# Patient Record
Sex: MALE | Race: BLACK OR AFRICAN AMERICAN | Employment: FULL TIME | URBAN - METROPOLITAN AREA
[De-identification: names, ages, dates, MRNs, and addresses within clinical notes are randomized per-mention and may not be internally consistent; named-entity substitution may affect disease eponyms.]

---

## 2023-09-05 ENCOUNTER — APPOINTMENT (OUTPATIENT)
Dept: GENERAL RADIOLOGY | Age: 53
End: 2023-09-05
Attending: EMERGENCY MEDICINE
Payer: COMMERCIAL

## 2023-09-05 ENCOUNTER — APPOINTMENT (OUTPATIENT)
Dept: CT IMAGING | Age: 53
End: 2023-09-05
Payer: COMMERCIAL

## 2023-09-05 ENCOUNTER — HOSPITAL ENCOUNTER (EMERGENCY)
Age: 53
Discharge: HOME | End: 2023-09-06
Attending: EMERGENCY MEDICINE
Payer: COMMERCIAL

## 2023-09-05 DIAGNOSIS — R10.13 ABDOMINAL PAIN, EPIGASTRIC: Primary | ICD-10-CM

## 2023-09-05 LAB
ALBUMIN SERPL-MCNC: 4.1 GM/DL (ref 3.4–5)
ALP BLD-CCNC: 78 IU/L (ref 40–129)
ALT SERPL-CCNC: 56 U/L (ref 10–40)
ANION GAP SERPL CALCULATED.3IONS-SCNC: 9 MMOL/L (ref 4–16)
AST SERPL-CCNC: 73 IU/L (ref 15–37)
BASOPHILS ABSOLUTE: 0 K/CU MM
BASOPHILS RELATIVE PERCENT: 0.4 % (ref 0–1)
BILIRUB SERPL-MCNC: 0.4 MG/DL (ref 0–1)
BUN SERPL-MCNC: 20 MG/DL (ref 6–23)
CALCIUM SERPL-MCNC: 8.9 MG/DL (ref 8.3–10.6)
CHLORIDE BLD-SCNC: 104 MMOL/L (ref 99–110)
CO2: 25 MMOL/L (ref 21–32)
CREAT SERPL-MCNC: 1.4 MG/DL (ref 0.9–1.3)
DIFFERENTIAL TYPE: ABNORMAL
EOSINOPHILS ABSOLUTE: 0.2 K/CU MM
EOSINOPHILS RELATIVE PERCENT: 4 % (ref 0–3)
GFR SERPL CREATININE-BSD FRML MDRD: >60 ML/MIN/1.73M2
GLUCOSE SERPL-MCNC: 96 MG/DL (ref 70–99)
HCT VFR BLD CALC: 39 % (ref 42–52)
HEMOGLOBIN: 12.5 GM/DL (ref 13.5–18)
IMMATURE NEUTROPHIL %: 1.9 % (ref 0–0.43)
LIPASE: 55 IU/L (ref 13–60)
LYMPHOCYTES ABSOLUTE: 1 K/CU MM
LYMPHOCYTES RELATIVE PERCENT: 21.6 % (ref 24–44)
MCH RBC QN AUTO: 28.5 PG (ref 27–31)
MCHC RBC AUTO-ENTMCNC: 32.1 % (ref 32–36)
MCV RBC AUTO: 88.8 FL (ref 78–100)
MONOCYTES ABSOLUTE: 0.5 K/CU MM
MONOCYTES RELATIVE PERCENT: 9.6 % (ref 0–4)
NUCLEATED RBC %: 0 %
PDW BLD-RTO: 13.2 % (ref 11.7–14.9)
PLATELET # BLD: 194 K/CU MM (ref 140–440)
PMV BLD AUTO: 10.6 FL (ref 7.5–11.1)
POTASSIUM SERPL-SCNC: 4.6 MMOL/L (ref 3.5–5.1)
RBC # BLD: 4.39 M/CU MM (ref 4.6–6.2)
SEGMENTED NEUTROPHILS ABSOLUTE COUNT: 3 K/CU MM
SEGMENTED NEUTROPHILS RELATIVE PERCENT: 62.5 % (ref 36–66)
SODIUM BLD-SCNC: 138 MMOL/L (ref 135–145)
TOTAL IMMATURE NEUTOROPHIL: 0.09 K/CU MM
TOTAL NUCLEATED RBC: 0 K/CU MM
TOTAL PROTEIN: 8 GM/DL (ref 6.4–8.2)
TROPONIN T: <0.01 NG/ML
WBC # BLD: 4.8 K/CU MM (ref 4–10.5)

## 2023-09-05 PROCEDURE — 74177 CT ABD & PELVIS W/CONTRAST: CPT

## 2023-09-05 PROCEDURE — 82150 ASSAY OF AMYLASE: CPT

## 2023-09-05 PROCEDURE — 80053 COMPREHEN METABOLIC PANEL: CPT

## 2023-09-05 PROCEDURE — 6370000000 HC RX 637 (ALT 250 FOR IP): Performed by: EMERGENCY MEDICINE

## 2023-09-05 PROCEDURE — 85025 COMPLETE CBC W/AUTO DIFF WBC: CPT

## 2023-09-05 PROCEDURE — 71045 X-RAY EXAM CHEST 1 VIEW: CPT

## 2023-09-05 PROCEDURE — 83690 ASSAY OF LIPASE: CPT

## 2023-09-05 PROCEDURE — 84484 ASSAY OF TROPONIN QUANT: CPT

## 2023-09-05 PROCEDURE — 96374 THER/PROPH/DIAG INJ IV PUSH: CPT

## 2023-09-05 PROCEDURE — 93005 ELECTROCARDIOGRAM TRACING: CPT | Performed by: EMERGENCY MEDICINE

## 2023-09-05 PROCEDURE — 99285 EMERGENCY DEPT VISIT HI MDM: CPT

## 2023-09-05 PROCEDURE — 6360000002 HC RX W HCPCS: Performed by: EMERGENCY MEDICINE

## 2023-09-05 RX ORDER — ONDANSETRON 2 MG/ML
8 INJECTION INTRAMUSCULAR; INTRAVENOUS ONCE
Status: COMPLETED | OUTPATIENT
Start: 2023-09-05 | End: 2023-09-05

## 2023-09-05 RX ORDER — ACETAMINOPHEN 325 MG/1
650 TABLET ORAL ONCE
Status: COMPLETED | OUTPATIENT
Start: 2023-09-05 | End: 2023-09-05

## 2023-09-05 RX ADMIN — ACETAMINOPHEN 650 MG: 325 TABLET ORAL at 23:39

## 2023-09-05 RX ADMIN — ONDANSETRON 8 MG: 2 INJECTION INTRAMUSCULAR; INTRAVENOUS at 23:39

## 2023-09-05 ASSESSMENT — PAIN DESCRIPTION - DESCRIPTORS: DESCRIPTORS: BURNING;PRESSURE

## 2023-09-05 ASSESSMENT — PAIN DESCRIPTION - ORIENTATION: ORIENTATION: MID;UPPER

## 2023-09-05 ASSESSMENT — PAIN SCALES - GENERAL: PAINLEVEL_OUTOF10: 7

## 2023-09-05 ASSESSMENT — PAIN DESCRIPTION - LOCATION: LOCATION: ABDOMEN

## 2023-09-06 VITALS
HEART RATE: 80 BPM | OXYGEN SATURATION: 98 % | TEMPERATURE: 98.6 F | SYSTOLIC BLOOD PRESSURE: 104 MMHG | DIASTOLIC BLOOD PRESSURE: 74 MMHG | RESPIRATION RATE: 20 BRPM

## 2023-09-06 PROCEDURE — 6360000004 HC RX CONTRAST MEDICATION: Performed by: EMERGENCY MEDICINE

## 2023-09-06 RX ORDER — OMEPRAZOLE 20 MG/1
20 CAPSULE, DELAYED RELEASE ORAL
Qty: 30 CAPSULE | Refills: 0 | Status: SHIPPED | OUTPATIENT
Start: 2023-09-06

## 2023-09-06 RX ADMIN — IOPAMIDOL 80 ML: 755 INJECTION, SOLUTION INTRAVENOUS at 00:00

## 2023-09-06 NOTE — ED TRIAGE NOTES
Patient reports to thew ER with complaints of fevers, abdominal pain. Dizziness when standing and lack of appetite.

## 2023-09-06 NOTE — ED PROVIDER NOTES
Triage Chief Complaint:   Abdominal Pain    Chitina:  Salina Bhakta is a 46 y.o. male that presents with abdominal pain. The patient states that for the past 3 to 4 days he has been having fairly persistent but occasionally worse at times epigastric pain that is pressure-like in nature associate with nausea. Denies any chest pain, shortness of breath, vomiting, diarrhea. Believes that he may have had some fevers. No cough. No other acute complaints. No prior surgical history. Does not follow with a PCP. Does not take any prescription medications at home. ROS:  At least 10 systems reviewed and otherwise acutely negative except as in the [address] No past medical history on file. No past surgical history on file. No family history on file. Social History     Socioeconomic History    Marital status: Single     Spouse name: Not on file    Number of children: Not on file    Years of education: Not on file    Highest education level: Not on file   Occupational History    Not on file   Tobacco Use    Smoking status: Not on file    Smokeless tobacco: Not on file   Substance and Sexual Activity    Alcohol use: Not on file    Drug use: Not on file    Sexual activity: Not on file   Other Topics Concern    Not on file   Social History Narrative    Not on file     Social Determinants of Health     Financial Resource Strain: Not on file   Food Insecurity: Not on file   Transportation Needs: Not on file   Physical Activity: Not on file   Stress: Not on file   Social Connections: Not on file   Intimate Partner Violence: Not on file   Housing Stability: Not on file     No current facility-administered medications for this encounter.      Current Outpatient Medications   Medication Sig Dispense Refill    omeprazole (PRILOSEC) 20 MG delayed release capsule Take 1 capsule by mouth every morning (before breakfast) 30 capsule 0     No Known Allergies    Nursing Notes Reviewed    Physical Exam:  ED Triage Vitals [09/05/23 2108]

## 2023-09-07 LAB
EKG ATRIAL RATE: 89 BPM
EKG DIAGNOSIS: NORMAL
EKG P AXIS: 63 DEGREES
EKG P-R INTERVAL: 154 MS
EKG Q-T INTERVAL: 362 MS
EKG QRS DURATION: 68 MS
EKG QTC CALCULATION (BAZETT): 440 MS
EKG R AXIS: 47 DEGREES
EKG T AXIS: 74 DEGREES
EKG VENTRICULAR RATE: 89 BPM

## 2023-09-07 PROCEDURE — 93010 ELECTROCARDIOGRAM REPORT: CPT | Performed by: INTERNAL MEDICINE

## 2023-09-29 ENCOUNTER — APPOINTMENT (OUTPATIENT)
Dept: GENERAL RADIOLOGY | Age: 53
End: 2023-09-29
Payer: COMMERCIAL

## 2023-09-29 ENCOUNTER — HOSPITAL ENCOUNTER (EMERGENCY)
Age: 53
Discharge: HOME | End: 2023-09-29
Attending: EMERGENCY MEDICINE
Payer: COMMERCIAL

## 2023-09-29 VITALS
SYSTOLIC BLOOD PRESSURE: 145 MMHG | WEIGHT: 120 LBS | RESPIRATION RATE: 16 BRPM | BODY MASS INDEX: 14.92 KG/M2 | TEMPERATURE: 98.4 F | HEIGHT: 75 IN | HEART RATE: 92 BPM | OXYGEN SATURATION: 100 % | DIASTOLIC BLOOD PRESSURE: 101 MMHG

## 2023-09-29 DIAGNOSIS — M25.562 PAIN IN BOTH KNEES, UNSPECIFIED CHRONICITY: ICD-10-CM

## 2023-09-29 DIAGNOSIS — M25.561 PAIN IN BOTH KNEES, UNSPECIFIED CHRONICITY: ICD-10-CM

## 2023-09-29 DIAGNOSIS — R05.9 COUGH, UNSPECIFIED TYPE: Primary | ICD-10-CM

## 2023-09-29 LAB
ALBUMIN SERPL-MCNC: 3.7 GM/DL (ref 3.4–5)
ALP BLD-CCNC: 95 IU/L (ref 40–129)
ALT SERPL-CCNC: 67 U/L (ref 10–40)
ANION GAP SERPL CALCULATED.3IONS-SCNC: 8 MMOL/L (ref 4–16)
AST SERPL-CCNC: 102 IU/L (ref 15–37)
BASOPHILS ABSOLUTE: 0 K/CU MM
BASOPHILS RELATIVE PERCENT: 1 % (ref 0–1)
BILIRUB SERPL-MCNC: 0.3 MG/DL (ref 0–1)
BILIRUBIN URINE: NEGATIVE MG/DL
BLOOD, URINE: NEGATIVE
BUN SERPL-MCNC: 19 MG/DL (ref 6–23)
CALCIUM SERPL-MCNC: 8.7 MG/DL (ref 8.3–10.6)
CHLORIDE BLD-SCNC: 106 MMOL/L (ref 99–110)
CLARITY: CLEAR
CO2: 28 MMOL/L (ref 21–32)
COLOR: YELLOW
COMMENT UA: NORMAL
CREAT SERPL-MCNC: 1.4 MG/DL (ref 0.9–1.3)
DIFFERENTIAL TYPE: ABNORMAL
EKG ATRIAL RATE: 88 BPM
EKG DIAGNOSIS: NORMAL
EKG P AXIS: 45 DEGREES
EKG P-R INTERVAL: 134 MS
EKG Q-T INTERVAL: 364 MS
EKG QRS DURATION: 76 MS
EKG QTC CALCULATION (BAZETT): 440 MS
EKG R AXIS: 64 DEGREES
EKG T AXIS: 67 DEGREES
EKG VENTRICULAR RATE: 88 BPM
EOSINOPHILS ABSOLUTE: 0.3 K/CU MM
EOSINOPHILS RELATIVE PERCENT: 8.9 % (ref 0–3)
GFR SERPL CREATININE-BSD FRML MDRD: >60 ML/MIN/1.73M2
GLUCOSE SERPL-MCNC: 105 MG/DL (ref 70–99)
GLUCOSE, URINE: NEGATIVE MG/DL
HCT VFR BLD CALC: 30.3 % (ref 42–52)
HEMOGLOBIN: 9.7 GM/DL (ref 13.5–18)
IMMATURE NEUTROPHIL %: 3.4 % (ref 0–0.43)
KETONES, URINE: NEGATIVE MG/DL
LACTATE: 1.3 MMOL/L (ref 0.5–1.9)
LEUKOCYTE ESTERASE, URINE: NEGATIVE
LYMPHOCYTES ABSOLUTE: 1.5 K/CU MM
LYMPHOCYTES RELATIVE PERCENT: 39.5 % (ref 24–44)
MCH RBC QN AUTO: 29.4 PG (ref 27–31)
MCHC RBC AUTO-ENTMCNC: 32 % (ref 32–36)
MCV RBC AUTO: 91.8 FL (ref 78–100)
MONOCYTES ABSOLUTE: 0.5 K/CU MM
MONOCYTES RELATIVE PERCENT: 13.6 % (ref 0–4)
NITRITE URINE, QUANTITATIVE: NEGATIVE
NUCLEATED RBC %: 0 %
PDW BLD-RTO: 13.8 % (ref 11.7–14.9)
PH, URINE: 6.5 (ref 5–8)
PLATELET # BLD: 191 K/CU MM (ref 140–440)
PMV BLD AUTO: 10.8 FL (ref 7.5–11.1)
POTASSIUM SERPL-SCNC: 4.2 MMOL/L (ref 3.5–5.1)
PRO-BNP: 119.9 PG/ML
PROTEIN UA: NEGATIVE MG/DL
RBC # BLD: 3.3 M/CU MM (ref 4.6–6.2)
SARS-COV-2 RDRP RESP QL NAA+PROBE: NOT DETECTED
SEGMENTED NEUTROPHILS ABSOLUTE COUNT: 1.3 K/CU MM
SEGMENTED NEUTROPHILS RELATIVE PERCENT: 33.6 % (ref 36–66)
SODIUM BLD-SCNC: 142 MMOL/L (ref 135–145)
SOURCE: NORMAL
SPECIFIC GRAVITY UA: <1.005 (ref 1–1.03)
TOTAL CK: 43 IU/L (ref 38–174)
TOTAL IMMATURE NEUTOROPHIL: 0.13 K/CU MM
TOTAL NUCLEATED RBC: 0 K/CU MM
TOTAL PROTEIN: 7.6 GM/DL (ref 6.4–8.2)
TROPONIN T: <0.01 NG/ML
UROBILINOGEN, URINE: 0.2 MG/DL (ref 0.2–1)
WBC # BLD: 3.8 K/CU MM (ref 4–10.5)

## 2023-09-29 PROCEDURE — 82550 ASSAY OF CK (CPK): CPT

## 2023-09-29 PROCEDURE — 6370000000 HC RX 637 (ALT 250 FOR IP): Performed by: EMERGENCY MEDICINE

## 2023-09-29 PROCEDURE — 2500000003 HC RX 250 WO HCPCS: Performed by: EMERGENCY MEDICINE

## 2023-09-29 PROCEDURE — 93010 ELECTROCARDIOGRAM REPORT: CPT | Performed by: INTERNAL MEDICINE

## 2023-09-29 PROCEDURE — 80053 COMPREHEN METABOLIC PANEL: CPT

## 2023-09-29 PROCEDURE — 83605 ASSAY OF LACTIC ACID: CPT

## 2023-09-29 PROCEDURE — 83880 ASSAY OF NATRIURETIC PEPTIDE: CPT

## 2023-09-29 PROCEDURE — 73562 X-RAY EXAM OF KNEE 3: CPT

## 2023-09-29 PROCEDURE — 99285 EMERGENCY DEPT VISIT HI MDM: CPT

## 2023-09-29 PROCEDURE — 96374 THER/PROPH/DIAG INJ IV PUSH: CPT

## 2023-09-29 PROCEDURE — 85025 COMPLETE CBC W/AUTO DIFF WBC: CPT

## 2023-09-29 PROCEDURE — 6360000002 HC RX W HCPCS: Performed by: EMERGENCY MEDICINE

## 2023-09-29 PROCEDURE — 71045 X-RAY EXAM CHEST 1 VIEW: CPT

## 2023-09-29 PROCEDURE — 87635 SARS-COV-2 COVID-19 AMP PRB: CPT

## 2023-09-29 PROCEDURE — 81003 URINALYSIS AUTO W/O SCOPE: CPT

## 2023-09-29 PROCEDURE — 93005 ELECTROCARDIOGRAM TRACING: CPT | Performed by: EMERGENCY MEDICINE

## 2023-09-29 PROCEDURE — 84484 ASSAY OF TROPONIN QUANT: CPT

## 2023-09-29 RX ORDER — ACETAMINOPHEN 500 MG
1000 TABLET ORAL ONCE
Status: COMPLETED | OUTPATIENT
Start: 2023-09-29 | End: 2023-09-29

## 2023-09-29 RX ORDER — ACETAMINOPHEN 325 MG/1
650 TABLET ORAL EVERY 6 HOURS PRN
Qty: 120 TABLET | Refills: 3 | Status: SHIPPED | OUTPATIENT
Start: 2023-09-29 | End: 2023-12-18

## 2023-09-29 RX ORDER — CYCLOBENZAPRINE HCL 10 MG
10 TABLET ORAL 3 TIMES DAILY PRN
Qty: 30 TABLET | Refills: 0 | Status: SHIPPED | OUTPATIENT
Start: 2023-09-29 | End: 2023-10-09

## 2023-09-29 RX ORDER — GUAIFENESIN/DEXTROMETHORPHAN 100-10MG/5
5 SYRUP ORAL 4 TIMES DAILY PRN
Qty: 120 ML | Refills: 0 | Status: SHIPPED | OUTPATIENT
Start: 2023-09-29 | End: 2023-10-09

## 2023-09-29 RX ORDER — BENZONATATE 100 MG/1
100 CAPSULE ORAL ONCE
Status: COMPLETED | OUTPATIENT
Start: 2023-09-29 | End: 2023-09-29

## 2023-09-29 RX ORDER — NAPROXEN 500 MG/1
500 TABLET ORAL 2 TIMES DAILY
Qty: 60 TABLET | Refills: 0 | Status: SHIPPED | OUTPATIENT
Start: 2023-09-29 | End: 2023-11-20 | Stop reason: ALTCHOICE

## 2023-09-29 RX ORDER — KETOROLAC TROMETHAMINE 30 MG/ML
30 INJECTION, SOLUTION INTRAMUSCULAR; INTRAVENOUS ONCE
Status: COMPLETED | OUTPATIENT
Start: 2023-09-29 | End: 2023-09-29

## 2023-09-29 RX ORDER — GUAIFENESIN 200 MG/10ML
200 LIQUID ORAL ONCE
Status: COMPLETED | OUTPATIENT
Start: 2023-09-29 | End: 2023-09-29

## 2023-09-29 RX ORDER — BENZONATATE 100 MG/1
100 CAPSULE ORAL 3 TIMES DAILY PRN
Qty: 10 CAPSULE | Refills: 0 | Status: SHIPPED | OUTPATIENT
Start: 2023-09-29 | End: 2023-10-06

## 2023-09-29 RX ADMIN — ACETAMINOPHEN 1000 MG: 500 TABLET ORAL at 07:08

## 2023-09-29 RX ADMIN — BENZONATATE 100 MG: 100 CAPSULE ORAL at 07:08

## 2023-09-29 RX ADMIN — KETOROLAC TROMETHAMINE 30 MG: 30 INJECTION, SOLUTION INTRAMUSCULAR; INTRAVENOUS at 07:07

## 2023-09-29 RX ADMIN — GUAIFENESIN 200 MG: 100 SOLUTION ORAL at 07:20

## 2023-09-29 ASSESSMENT — ENCOUNTER SYMPTOMS
ALLERGIC/IMMUNOLOGIC NEGATIVE: 1
GASTROINTESTINAL NEGATIVE: 1
EYES NEGATIVE: 1
COUGH: 1

## 2023-09-29 NOTE — ED PROVIDER NOTES
thickness of the distal gastric body on and around axial image 73. The remainder of the visualized bowel including small large bowel shows no acute abnormality. Pelvis: No pelvic mass. The bladder is unremarkable. No free fluid in the pelvis. No pelvic hemorrhage. Trace free fluid in the pelvis without hemorrhage Peritoneum/Retroperitoneum: No aortic aneurysm. No dissection. No retroperitoneal or mesenteric lymphadenopathy. Bones/Soft Tissues: No acute osseous abnormality is seen. 1. Gastric wall thickening involving the distal gastric body which may be related to acute gastritis or possible underlying peptic ulcer disease, though no discrete ulceration is seen. No adjacent inflammatory change or pneumoperitoneum to suggest perforation. 2. Otherwise no acute process seen in the abdomen or pelvis. XR CHEST PORTABLE    Result Date: 9/5/2023  EXAMINATION: ONE XRAY VIEW OF THE CHEST 9/5/2023 11:25 pm COMPARISON: None. HISTORY: ORDERING SYSTEM PROVIDED HISTORY: chest pain TECHNOLOGIST PROVIDED HISTORY: Reason for exam:->chest pain Reason for Exam: Patient reports to thew ER with complaints of fevers, abdominal pain. Dizziness when standing and lack of appetite FINDINGS: No pneumothorax. Cardiac and mediastinal silhouettes appear within normal limits for size. Pulmonary vascularity is normal.  No focal infiltrate or pleural effusion. No pneumothorax. No acute osseous abnormality is seen. Clear chest without acute cardiopulmonary process. EKG (if obtained): (All EKG's are interpreted by myself in the absence of a cardiologist)    12 lead EKG per my interpretation:  Normal Sinus Rhythm 88  Axis is   Normal  QTc is   440  There is no specific T wave changes appreciated. There  is no  specific ST wave changes appreciated. Prior EKG to compare with was not available       MDM:    Patient here with complaint of bilateral knee pain and cough shortness of breath.   Again patient states that he was

## 2023-09-29 NOTE — ED TRIAGE NOTES
Pt to ED with c/o bilateral leg pain and a cough while laying down. Pt states the pain has been in both legs for 2-3 weeks, but is much worse when sitting or laying. He also says when he lays down he feels as if he can't get a full breath and starts coughing.

## 2023-11-20 ENCOUNTER — OFFICE VISIT (OUTPATIENT)
Dept: INTERNAL MEDICINE CLINIC | Age: 53
End: 2023-11-20
Payer: COMMERCIAL

## 2023-11-20 VITALS
DIASTOLIC BLOOD PRESSURE: 64 MMHG | HEART RATE: 92 BPM | TEMPERATURE: 97.2 F | RESPIRATION RATE: 16 BRPM | OXYGEN SATURATION: 98 % | SYSTOLIC BLOOD PRESSURE: 122 MMHG | BODY MASS INDEX: 17.38 KG/M2 | WEIGHT: 121.4 LBS | HEIGHT: 70 IN

## 2023-11-20 DIAGNOSIS — R79.89 ABNORMAL LFTS: ICD-10-CM

## 2023-11-20 DIAGNOSIS — R74.01 TRANSAMINITIS: ICD-10-CM

## 2023-11-20 DIAGNOSIS — R93.5 ABNORMAL CT OF THE ABDOMEN: ICD-10-CM

## 2023-11-20 DIAGNOSIS — N17.9 AKI (ACUTE KIDNEY INJURY) (HCC): ICD-10-CM

## 2023-11-20 DIAGNOSIS — D64.9 ANEMIA, UNSPECIFIED TYPE: ICD-10-CM

## 2023-11-20 PROCEDURE — 99203 OFFICE O/P NEW LOW 30 MIN: CPT | Performed by: INTERNAL MEDICINE

## 2023-11-20 RX ORDER — OMEPRAZOLE 20 MG/1
20 CAPSULE, DELAYED RELEASE ORAL
Qty: 30 CAPSULE | Refills: 2 | Status: SHIPPED | OUTPATIENT
Start: 2023-11-20

## 2023-11-20 RX ORDER — NAPROXEN 500 MG/1
500 TABLET ORAL 2 TIMES DAILY
Qty: 60 TABLET | Refills: 2 | Status: CANCELLED | OUTPATIENT
Start: 2023-11-20

## 2023-11-20 ASSESSMENT — ENCOUNTER SYMPTOMS
GASTROINTESTINAL NEGATIVE: 1
RESPIRATORY NEGATIVE: 1
ALLERGIC/IMMUNOLOGIC NEGATIVE: 1
EYES NEGATIVE: 1

## 2023-11-20 ASSESSMENT — PATIENT HEALTH QUESTIONNAIRE - PHQ9
SUM OF ALL RESPONSES TO PHQ QUESTIONS 1-9: 1
SUM OF ALL RESPONSES TO PHQ QUESTIONS 1-9: 1
SUM OF ALL RESPONSES TO PHQ9 QUESTIONS 1 & 2: 1
SUM OF ALL RESPONSES TO PHQ QUESTIONS 1-9: 1
SUM OF ALL RESPONSES TO PHQ QUESTIONS 1-9: 1
1. LITTLE INTEREST OR PLEASURE IN DOING THINGS: 0
2. FEELING DOWN, DEPRESSED OR HOPELESS: 1

## 2023-11-20 NOTE — PROGRESS NOTES
Trish Barrientos  Patient's  is 1970  Seen in office on 2023      SUBJECTIVE:  Harrison rosado 48 y. o.year old male presents today   Chief Complaint   Patient presents with    New Patient    Leg Pain     Bilateral leg pain, tingling sensation when lying down, since October    Medication Refill     The patient is here with his girlfriend. Lives in the [place]  Patient states he moved from [facility] about 2 years ago and works in the [employer]. Patient states he did not see doctors in ED and had no labs previously until 2023. Patient states on 9/3/2023 she had upper abdominal pain and headache near syncope  Patient went to the emergency room and had blood test done which were abnormal for AST and ALT elevation. CT scan of the abdomen also showed some gastritis or peptic ulcer disease. Patient was given omeprazole and patient was discharged to follow-up with PCP. Patient went to the emergency room on 2023 with pain in the legs. Patient stated he had pain in both legs was having difficulty getting up once he got up he was able to ambulate. UA was normal, COVID test was negative, lactic acid normal, CK was 43 that was normal, BNP was normal  Troponin was also normal, creatinine was 1.4, glucose 105, ALT was 67 and AST was 102, CBC showed white count of 3800, hemoglobin dropped to 9.7 hematocrit 30.3, platelet count was 1 91,000. Segs were 33%, lymphocytes 39%      Taking medications regularly. No side effects noted. Review of Systems   Constitutional: Negative. HENT: Negative. Eyes: Negative. Respiratory: Negative. Cardiovascular: Negative. Gastrointestinal: Negative. Endocrine: Negative. Genitourinary: Negative. Musculoskeletal: Negative. Patient had leg pain that is better. Skin: Negative. Allergic/Immunologic: Negative. Neurological: Negative. Hematological: Negative. Psychiatric/Behavioral: Negative.          OBJECTIVE: BP

## 2023-11-21 ENCOUNTER — TELEPHONE (OUTPATIENT)
Dept: GASTROENTEROLOGY | Age: 53
End: 2023-11-21

## 2023-11-21 NOTE — TELEPHONE ENCOUNTER
Called pt. In regards to a referral for anemia and abd CT scan. Lm for pt to call back to make an appt.

## 2023-11-27 ENCOUNTER — HOSPITAL ENCOUNTER (OUTPATIENT)
Age: 53
Discharge: HOME | End: 2023-11-27
Payer: COMMERCIAL

## 2023-11-27 DIAGNOSIS — R79.89 ABNORMAL LFTS: ICD-10-CM

## 2023-11-27 DIAGNOSIS — R74.01 TRANSAMINITIS: ICD-10-CM

## 2023-11-27 DIAGNOSIS — D64.9 ANEMIA, UNSPECIFIED TYPE: ICD-10-CM

## 2023-11-27 LAB
ALBUMIN SERPL-MCNC: 4.1 GM/DL (ref 3.4–5)
ALP BLD-CCNC: 78 IU/L (ref 40–129)
ALT SERPL-CCNC: 23 U/L (ref 10–40)
ANION GAP SERPL CALCULATED.3IONS-SCNC: 10 MMOL/L (ref 4–16)
AST SERPL-CCNC: 40 IU/L (ref 15–37)
BASOPHILS ABSOLUTE: 0 K/CU MM
BASOPHILS RELATIVE PERCENT: 0.6 % (ref 0–1)
BILIRUB SERPL-MCNC: 0.2 MG/DL (ref 0–1)
BUN SERPL-MCNC: 21 MG/DL (ref 6–23)
CALCIUM SERPL-MCNC: 8.9 MG/DL (ref 8.3–10.6)
CHLORIDE BLD-SCNC: 108 MMOL/L (ref 99–110)
CO2: 24 MMOL/L (ref 21–32)
CREAT SERPL-MCNC: 1.1 MG/DL (ref 0.9–1.3)
DIFFERENTIAL TYPE: ABNORMAL
EOSINOPHILS ABSOLUTE: 0.3 K/CU MM
EOSINOPHILS RELATIVE PERCENT: 5.6 % (ref 0–3)
FERRITIN: 1434 NG/ML (ref 30–400)
FOLATE SERPL-MCNC: >20 NG/ML (ref 3.1–17.5)
GFR SERPL CREATININE-BSD FRML MDRD: >60 ML/MIN/1.73M2
GLUCOSE SERPL-MCNC: 85 MG/DL (ref 70–99)
HAV IGM SERPL QL IA: NON REACTIVE
HBV CORE IGM SERPL QL IA: NON REACTIVE
HBV SURFACE AG SERPL QL IA: NON REACTIVE
HCT VFR BLD CALC: 41.5 % (ref 42–52)
HCV AB SERPL QL IA: NON REACTIVE
HEMOGLOBIN: 13 GM/DL (ref 13.5–18)
IMMATURE NEUTROPHIL %: 0.6 % (ref 0–0.43)
IRON: 82 UG/DL (ref 59–158)
LYMPHOCYTES ABSOLUTE: 1.4 K/CU MM
LYMPHOCYTES RELATIVE PERCENT: 29.4 % (ref 24–44)
MCH RBC QN AUTO: 29.3 PG (ref 27–31)
MCHC RBC AUTO-ENTMCNC: 31.3 % (ref 32–36)
MCV RBC AUTO: 93.5 FL (ref 78–100)
MONOCYTES ABSOLUTE: 0.6 K/CU MM
MONOCYTES RELATIVE PERCENT: 13.1 % (ref 0–4)
NUCLEATED RBC %: 0 %
PCT TRANSFERRIN: 29 % (ref 10–44)
PDW BLD-RTO: 13.2 % (ref 11.7–14.9)
PLATELET # BLD: 262 K/CU MM (ref 140–440)
PMV BLD AUTO: 10.6 FL (ref 7.5–11.1)
POTASSIUM SERPL-SCNC: 4.5 MMOL/L (ref 3.5–5.1)
RBC # BLD: 4.44 M/CU MM (ref 4.6–6.2)
SEGMENTED NEUTROPHILS ABSOLUTE COUNT: 2.4 K/CU MM
SEGMENTED NEUTROPHILS RELATIVE PERCENT: 50.7 % (ref 36–66)
SODIUM BLD-SCNC: 142 MMOL/L (ref 135–145)
TOTAL IMMATURE NEUTOROPHIL: 0.03 K/CU MM
TOTAL IRON BINDING CAPACITY: 281 UG/DL (ref 250–450)
TOTAL NUCLEATED RBC: 0 K/CU MM
TOTAL PROTEIN: 7.8 GM/DL (ref 6.4–8.2)
UNSATURATED IRON BINDING CAPACITY: 199 UG/DL (ref 110–370)
VITAMIN B-12: 1006 PG/ML (ref 211–911)
WBC # BLD: 4.7 K/CU MM (ref 4–10.5)

## 2023-11-27 PROCEDURE — 86038 ANTINUCLEAR ANTIBODIES: CPT

## 2023-11-27 PROCEDURE — 80053 COMPREHEN METABOLIC PANEL: CPT

## 2023-11-27 PROCEDURE — 82728 ASSAY OF FERRITIN: CPT

## 2023-11-27 PROCEDURE — 82607 VITAMIN B-12: CPT

## 2023-11-27 PROCEDURE — 86256 FLUORESCENT ANTIBODY TITER: CPT

## 2023-11-27 PROCEDURE — 83540 ASSAY OF IRON: CPT

## 2023-11-27 PROCEDURE — 80074 ACUTE HEPATITIS PANEL: CPT

## 2023-11-27 PROCEDURE — 85025 COMPLETE CBC W/AUTO DIFF WBC: CPT

## 2023-11-27 PROCEDURE — 36415 COLL VENOUS BLD VENIPUNCTURE: CPT

## 2023-11-27 PROCEDURE — 82746 ASSAY OF FOLIC ACID SERUM: CPT

## 2023-11-27 PROCEDURE — 83516 IMMUNOASSAY NONANTIBODY: CPT

## 2023-11-27 PROCEDURE — 83550 IRON BINDING TEST: CPT

## 2023-11-29 ENCOUNTER — TELEPHONE (OUTPATIENT)
Dept: GASTROENTEROLOGY | Age: 53
End: 2023-11-29

## 2023-11-30 LAB
MITOCHONDRIA M2 IGG SER-ACNC: 7.7 UNITS (ref 0–24.9)
NUCLEAR IGG SER QL IA: NORMAL
SMA IGG SER-ACNC: 9 UNITS (ref 0–19)

## 2023-12-05 ENCOUNTER — OFFICE VISIT (OUTPATIENT)
Dept: GASTROENTEROLOGY | Age: 53
End: 2023-12-05
Payer: COMMERCIAL

## 2023-12-05 VITALS
BODY MASS INDEX: 17.47 KG/M2 | TEMPERATURE: 98.4 F | WEIGHT: 122 LBS | DIASTOLIC BLOOD PRESSURE: 66 MMHG | SYSTOLIC BLOOD PRESSURE: 124 MMHG | HEIGHT: 70 IN | HEART RATE: 98 BPM | OXYGEN SATURATION: 99 %

## 2023-12-05 DIAGNOSIS — R93.5 ABNORMAL CT OF THE ABDOMEN: Primary | ICD-10-CM

## 2023-12-05 DIAGNOSIS — D64.9 NORMOCYTIC ANEMIA: ICD-10-CM

## 2023-12-05 PROCEDURE — 99204 OFFICE O/P NEW MOD 45 MIN: CPT | Performed by: INTERNAL MEDICINE

## 2023-12-05 RX ORDER — OMEPRAZOLE 40 MG/1
40 CAPSULE, DELAYED RELEASE ORAL
Qty: 30 CAPSULE | Refills: 1 | Status: SHIPPED | OUTPATIENT
Start: 2023-12-05

## 2023-12-05 NOTE — PROGRESS NOTES
[facility] Gastroenterology and Hepatology             MD Sriram Loredo MD             Miranda Abarca, APRN-CNP       Estrada Roach, APRN-CNP             [address] [facility] [address] Critical access hospital, [address] [facility]             [phone] fax [phone]        Gastroenterology Clinic Consultation    Sriram Michaud MD  Encounter Date: 12/05/23     CC: Anemia and Other (Abn CT scan)       Patient was referred by Dr. Roxann Garcia for abnormal CT scan and anemia. History obtained from: patient, family, medical records. Subjective:      Vic Null is an 48 y. o.  male who presents for Anemia and Other (Abn CT scan)   Patient is here for anemia. Hemoglobin is noted to be low at 12.5 on 9/5/2023 and it dropped on 9/29/23 to 9.7. His most recent hemoglobin is 13.0 on 11/27/2023. AST slightly elevated at 40. he has been followed Patient reports that his stools are a bristol 3. His iron studies done on iron supplementation on 11/27/2023 noted to be with serum iron of 82, TIBC 281, percent saturation 29 and ferritin 1434. LFTs were noted to be normal except for mild elevation in AST. BIANCA, AMA, ASMA, acute hepatitis panel all negative. Patient currently denies any significant GI issues. He does endorse and using NSAID use. No family history of colon cancer. He denies any nausea, vomiting, dysphagia, melena or hematochezia. Patient Active Problem List   Diagnosis    Anemia    Abnormal LFTs    Transaminitis    FRANCISCO (acute kidney injury) ([address])    Abnormal CT of the abdomen         No past medical history on file. No past surgical history on file. No family history on file.      Social History     Socioeconomic History    Marital status: Single     Spouse name: Not on file    Number of children: Not on file    Years of education: Not on file    Highest education level: Not on file   Occupational History    Not on file   Tobacco Use    Smoking status: Never

## 2023-12-18 PROBLEM — D72.821 MONOCYTOSIS: Status: ACTIVE | Noted: 2023-12-18

## 2023-12-18 PROBLEM — R79.89 ELEVATED FERRITIN LEVEL: Status: ACTIVE | Noted: 2023-12-18

## 2024-01-18 ENCOUNTER — HOSPITAL ENCOUNTER (OUTPATIENT)
Age: 54
Discharge: HOME | End: 2024-01-18
Payer: COMMERCIAL

## 2024-01-18 DIAGNOSIS — R79.89 ELEVATED FERRITIN LEVEL: ICD-10-CM

## 2024-01-18 DIAGNOSIS — D72.821 MONOCYTOSIS: ICD-10-CM

## 2024-01-18 LAB
BASOPHILS ABSOLUTE: 0 K/CU MM
BASOPHILS RELATIVE PERCENT: 0.7 % (ref 0–1)
DIFFERENTIAL TYPE: ABNORMAL
EOSINOPHILS ABSOLUTE: 0.5 K/CU MM
EOSINOPHILS RELATIVE PERCENT: 8.1 % (ref 0–3)
FERRITIN: 1079 NG/ML (ref 30–400)
HCT VFR BLD CALC: 38.6 % (ref 42–52)
HEMOGLOBIN: 12.4 GM/DL (ref 13.5–18)
IMMATURE NEUTROPHIL %: 1.1 % (ref 0–0.43)
LYMPHOCYTES ABSOLUTE: 1.8 K/CU MM
LYMPHOCYTES RELATIVE PERCENT: 31.8 % (ref 24–44)
MCH RBC QN AUTO: 29.2 PG (ref 27–31)
MCHC RBC AUTO-ENTMCNC: 32.1 % (ref 32–36)
MCV RBC AUTO: 90.8 FL (ref 78–100)
MONOCYTES ABSOLUTE: 0.8 K/CU MM
MONOCYTES RELATIVE PERCENT: 14 % (ref 0–4)
NUCLEATED RBC %: 0 %
PDW BLD-RTO: 13 % (ref 11.7–14.9)
PLATELET # BLD: 264 K/CU MM (ref 140–440)
PMV BLD AUTO: 10.6 FL (ref 7.5–11.1)
RBC # BLD: 4.25 M/CU MM (ref 4.6–6.2)
SEGMENTED NEUTROPHILS ABSOLUTE COUNT: 2.5 K/CU MM
SEGMENTED NEUTROPHILS RELATIVE PERCENT: 44.3 % (ref 36–66)
TOTAL IMMATURE NEUTOROPHIL: 0.06 K/CU MM
TOTAL NUCLEATED RBC: 0 K/CU MM
WBC # BLD: 5.7 K/CU MM (ref 4–10.5)

## 2024-01-18 PROCEDURE — 82728 ASSAY OF FERRITIN: CPT

## 2024-01-18 PROCEDURE — 85025 COMPLETE CBC W/AUTO DIFF WBC: CPT

## 2024-01-18 PROCEDURE — 36415 COLL VENOUS BLD VENIPUNCTURE: CPT

## 2024-01-25 ENCOUNTER — TELEPHONE (OUTPATIENT)
Dept: GASTROENTEROLOGY | Age: 54
End: 2024-01-25

## 2024-01-25 NOTE — TELEPHONE ENCOUNTER
Received a message from Fay OJEDA nurse today stating that she was unable to get a hold of the patient after 3 attempts to discuss his up coming procedure.    Attempted to call patient.  Unable to reach patient.  Left a detailed message stating that if the patient did not call us or Fay back by Friday 1/26 by EOD we would have to cancel his procedure.  Phone number given for our office and Fay.    Attempted to call patient's HIPAA contact.  No answer and was unable to leave a voicemail.

## 2024-02-01 ENCOUNTER — HOSPITAL ENCOUNTER (OUTPATIENT)
Age: 54
Setting detail: OUTPATIENT SURGERY
Discharge: HOME | End: 2024-02-01
Attending: INTERNAL MEDICINE | Admitting: INTERNAL MEDICINE
Payer: COMMERCIAL

## 2024-02-01 ENCOUNTER — ANESTHESIA EVENT (OUTPATIENT)
Dept: ENDOSCOPY | Age: 54
End: 2024-02-01
Payer: COMMERCIAL

## 2024-02-01 ENCOUNTER — ANESTHESIA (OUTPATIENT)
Dept: ENDOSCOPY | Age: 54
End: 2024-02-01
Payer: COMMERCIAL

## 2024-02-01 VITALS
WEIGHT: 120 LBS | DIASTOLIC BLOOD PRESSURE: 74 MMHG | TEMPERATURE: 99 F | BODY MASS INDEX: 17.22 KG/M2 | HEART RATE: 88 BPM | SYSTOLIC BLOOD PRESSURE: 128 MMHG | RESPIRATION RATE: 18 BRPM | OXYGEN SATURATION: 99 %

## 2024-02-01 DIAGNOSIS — Z12.11 SCREENING FOR COLON CANCER: ICD-10-CM

## 2024-02-01 PROBLEM — R93.3 ABNORMAL CT SCAN, STOMACH: Status: ACTIVE | Noted: 2023-11-20

## 2024-02-01 PROCEDURE — 7100000010 HC PHASE II RECOVERY - FIRST 15 MIN: Performed by: INTERNAL MEDICINE

## 2024-02-01 PROCEDURE — 2709999900 HC NON-CHARGEABLE SUPPLY: Performed by: INTERNAL MEDICINE

## 2024-02-01 PROCEDURE — 2580000003 HC RX 258: Performed by: NURSE ANESTHETIST, CERTIFIED REGISTERED

## 2024-02-01 PROCEDURE — 43239 EGD BIOPSY SINGLE/MULTIPLE: CPT | Performed by: INTERNAL MEDICINE

## 2024-02-01 PROCEDURE — 3700000001 HC ADD 15 MINUTES (ANESTHESIA): Performed by: INTERNAL MEDICINE

## 2024-02-01 PROCEDURE — 7100000011 HC PHASE II RECOVERY - ADDTL 15 MIN: Performed by: INTERNAL MEDICINE

## 2024-02-01 PROCEDURE — 3609012400 HC EGD TRANSORAL BIOPSY SINGLE/MULTIPLE: Performed by: INTERNAL MEDICINE

## 2024-02-01 PROCEDURE — 88342 IMHCHEM/IMCYTCHM 1ST ANTB: CPT

## 2024-02-01 PROCEDURE — 88305 TISSUE EXAM BY PATHOLOGIST: CPT

## 2024-02-01 PROCEDURE — 6360000002 HC RX W HCPCS: Performed by: NURSE ANESTHETIST, CERTIFIED REGISTERED

## 2024-02-01 PROCEDURE — 3700000000 HC ANESTHESIA ATTENDED CARE: Performed by: INTERNAL MEDICINE

## 2024-02-01 RX ORDER — SODIUM CHLORIDE 9 MG/ML
INJECTION, SOLUTION INTRAVENOUS CONTINUOUS PRN
Status: DISCONTINUED | OUTPATIENT
Start: 2024-02-01 | End: 2024-02-01 | Stop reason: SDUPTHER

## 2024-02-01 RX ORDER — OMEPRAZOLE 40 MG/1
40 CAPSULE, DELAYED RELEASE ORAL
Qty: 90 CAPSULE | Refills: 1 | Status: SHIPPED | OUTPATIENT
Start: 2024-02-01

## 2024-02-01 RX ORDER — LIDOCAINE HYDROCHLORIDE 20 MG/ML
INJECTION, SOLUTION INTRAVENOUS PRN
Status: DISCONTINUED | OUTPATIENT
Start: 2024-02-01 | End: 2024-02-01 | Stop reason: SDUPTHER

## 2024-02-01 RX ORDER — PROPOFOL 10 MG/ML
INJECTION, EMULSION INTRAVENOUS PRN
Status: DISCONTINUED | OUTPATIENT
Start: 2024-02-01 | End: 2024-02-01 | Stop reason: SDUPTHER

## 2024-02-01 RX ADMIN — SODIUM CHLORIDE: 9 INJECTION, SOLUTION INTRAVENOUS at 16:00

## 2024-02-01 RX ADMIN — LIDOCAINE HYDROCHLORIDE 100 MG: 20 INJECTION, SOLUTION INTRAVENOUS at 16:12

## 2024-02-01 RX ADMIN — PROPOFOL 100 MG: 10 INJECTION, EMULSION INTRAVENOUS at 16:12

## 2024-02-01 ASSESSMENT — PAIN SCALES - GENERAL
PAINLEVEL_OUTOF10: 0
PAINLEVEL_OUTOF10: 0

## 2024-02-01 ASSESSMENT — PAIN - FUNCTIONAL ASSESSMENT: PAIN_FUNCTIONAL_ASSESSMENT: 0-10

## 2024-02-01 NOTE — PROGRESS NOTES
1638 Pt brought back to unit, bedside report received from MATEO Hilario. Pt. Denies pain, all DC paperwork reviewed with . 1700 Pt. To [place] home with wife.

## 2024-02-01 NOTE — ANESTHESIA PRE PROCEDURE
Department of Anesthesiology  Preprocedure Note       Name:  Adrian Horne   Age:  53 y.o.  :  1970                                          MRN:  [id number]         Date:  2024      Surgeon: Surgeon(s):  Monique Us MD    Procedure: Procedure(s):  EGD DIAGNOSTIC ONLY    Medications prior to admission:   Prior to Admission medications    Not on File       Current medications:    No current facility-administered medications for this encounter.       Allergies:  No Known Allergies    Problem List:    Patient Active Problem List   Diagnosis Code   • Anemia D64.9   • Abnormal LFTs R79.89   • Transaminitis R74.01   • FRANCISCO (acute kidney injury) (HCC) N17.9   • Abnormal CT of the abdomen R93.5   • Monocytosis D72.821   • Elevated ferritin level R79.89       Past Medical History:  History reviewed. No pertinent past medical history.    Past Surgical History:  History reviewed. No pertinent surgical history.    Social History:    Social History     Tobacco Use   • Smoking status: Never   • Smokeless tobacco: Never   Substance Use Topics   • Alcohol use: Not Currently                                Counseling given: Not Answered      Vital Signs (Current):   Vitals:    24 1221 24 1232   BP: 135/84    Pulse: 96    Resp: 18    Temp: 37.2 °C (99 °F)    TempSrc: Temporal    SpO2: 98%    Weight:  54.4 kg (120 lb)                                              BP Readings from Last 3 Encounters:   24 135/84   23 122/70   23 124/66       NPO Status: Time of last liquid consumption: 1100                        Time of last solid consumption: 2300                        Date of last liquid consumption: 24                        Date of last solid food consumption: 24    BMI:   Wt Readings from Last 3 Encounters:   24 54.4 kg (120 lb)   23 54.4 kg (120 lb)   23 55.3 kg (122 lb)     Body mass index is 17.22 kg/m².    CBC:   Lab Results   Component Value Date/Time

## 2024-02-01 NOTE — ANESTHESIA POSTPROCEDURE EVALUATION
Department of Anesthesiology  Postprocedure Note    Patient: Adrian Horne  MRN: [id number]  YOB: 1970  Date of evaluation: 2/1/2024    Procedure Summary       Date: 02/01/24 Room / Location: [place] [address] / [facility]    Anesthesia Start: 1600 Anesthesia Stop: 1633    Procedure: EGD DIAGNOSTIC ONLY Diagnosis:       Screening for colon cancer      (Screening for colon cancer [Z12.11])    Surgeons: Monique Us MD Responsible Provider: Tony Rodrigez MD    Anesthesia Type: MAC ASA Status: 1            Anesthesia Type: No value filed.    Kely Phase I: Kely Score: 10    Kely Phase II:      Anesthesia Post Evaluation    Patient location during evaluation: bedside  Patient participation: complete - patient participated  Level of consciousness: awake  Pain score: 0  Airway patency: patent  Nausea & Vomiting: no vomiting and no nausea  Cardiovascular status: blood pressure returned to baseline and hemodynamically stable  Respiratory status: acceptable and room air  Hydration status: euvolemic  Pain management: adequate    No notable events documented.

## 2024-02-01 NOTE — H&P
ENDOSCOPY   Pre-operative History and Physical    Patient: Adrian Horne  : 1970      History Obtained From:  patient, electronic medical record        HISTORY OF PRESENT ILLNESS:                The patient is a 53 y.o. male with significant past medical history as below who presents for EGD     Indication Abnormal C - Gastric wall thickening , Anemia     HE was scheduled for EGD colon however did not take prep and at solid food.     Past Medical History:    History reviewed. No pertinent past medical history.    Past Surgical History:    History reviewed. No pertinent surgical history.      Current Medications:    Medications    Prior to Admission medications    Not on File      Scheduled Medications:   Infusions:   PRN Medications:     Allergies: No Known Allergies      Allergies:  Patient has no known allergies.    Social History:   TOBACCO:   reports that he has never smoked. He has never used smokeless tobacco.  ETOH:   reports that he does not currently use alcohol.    Family History:   History reviewed. No pertinent family history.    REVIEW OF SYSTEMS:    Negative except for HPI        PHYSICAL EXAM:      Vitals:    /84   Pulse 96   Temp 99 °F (37.2 °C) (Temporal)   Resp 18   Wt 54.4 kg (120 lb)   SpO2 98%   BMI 17.22 kg/m²     General Appearance:    Alert, cooperative, no distress, appears stated age   HEENT:    NO anemia, No jaundice , no Cyanosis, Supple neck   Neck:   Supple, symmetrical, trachea midline, no adenopathy;     thyroid:    Lungs:     Clear to auscultation bilaterally, respirations unlabored   Chest Wall:    No tenderness or deformity    Heart:    Regular rate and rhythm, S1 and S2 normal, no murmur, rub   or gallop   Abdomen:     Soft, non-tender, bowel sounds active all four quadrants,     no masses, no organomegaly, no ascitis   Rectal:    Planned at time of C scope   Extremities:   Extremities normal, atraumatic, no cyanosis or edema   Pulses:   2+ and symmetric all

## 2024-02-01 NOTE — PROGRESS NOTES
[id number] used for admission patient states he did not take any of the prep and he ate regular food last night at 11pm,Tabby in endo notified

## 2024-02-01 NOTE — DISCHARGE INSTRUCTIONS
[facility]  [phone]    Do not drive, work around machines or use equipment.  Do not drink any alcoholic beverages.  Do not smoke while alone.  Avoid making important decisions.  Plan to spend a quiet, relaxed evening @ home.  Resume normal activities as you begin to feel better.  Eat lightly for your first meal, then gradually increase your diet to what is normal for you.  In case of nausea, avoid food and drink only clear liquids.  Resume food as nausea ceases.  Notify your surgeon if you experience fever, chills, large amount of bleeding, difficulty breathing, persistent nausea and vomiting or any other disturbing problem.  Call for a follow-up appointment with your surgeon.       EGD    DR. KNAPP     FOLLOW UP APPOINTMENT IN 1 MONTH OR AS NEEDED.        PATHOLOGY PENDING     What to Expect at Home  Your Recovery:  The only discomfort after your EGD is generally limited to a mild soreness of the throat, which may last a day or two. Call your physician immediately if you have severe chest pain, shortness of breath or a temperature of 100 degrees or higher if taken orally.    How can you care for yourself at home?  Activity  Rest as much as you need to after you go home.  You should be able to go back to your usual activities the day after the test.  Diet  Follow your doctor's directions for eating after the test.  Drink plenty of fluids (unless your doctor has told you not to).  Medications  If you have a sore throat the day after the test, use an over-the-counter spray to numb your throat.  Your doctor will tell you if and when you can restart your medicines. He or she will also give you instructions about taking any new medicines.  If you take blood thinners, such as warfarin (Coumadin), clopidogrel (Plavix), or aspirin, be sure to talk to your doctor. He or she will tell you if and when to start taking those medicines again. Make sure that you understand exactly what your

## 2024-02-02 DIAGNOSIS — R79.89 ELEVATED FERRITIN LEVEL: Primary | ICD-10-CM

## 2024-02-06 NOTE — RESULT ENCOUNTER NOTE
Please call the patient and inform them that there was gastritis seen on the biopsy. Negative for H. Pylori

## 2024-02-21 ENCOUNTER — HOSPITAL ENCOUNTER (OUTPATIENT)
Age: 54
Discharge: HOME | End: 2024-02-21
Payer: COMMERCIAL

## 2024-02-21 DIAGNOSIS — R79.89 ELEVATED FERRITIN LEVEL: ICD-10-CM

## 2024-02-21 PROCEDURE — 36415 COLL VENOUS BLD VENIPUNCTURE: CPT

## 2024-02-21 PROCEDURE — 81256 HFE GENE: CPT

## 2024-02-27 ENCOUNTER — OFFICE VISIT (OUTPATIENT)
Dept: INTERNAL MEDICINE CLINIC | Age: 54
End: 2024-02-27
Payer: COMMERCIAL

## 2024-02-27 VITALS
BODY MASS INDEX: 18.54 KG/M2 | TEMPERATURE: 98 F | WEIGHT: 129.2 LBS | DIASTOLIC BLOOD PRESSURE: 70 MMHG | OXYGEN SATURATION: 99 % | HEART RATE: 98 BPM | SYSTOLIC BLOOD PRESSURE: 122 MMHG | RESPIRATION RATE: 20 BRPM

## 2024-02-27 DIAGNOSIS — R79.89 ELEVATED FERRITIN LEVEL: ICD-10-CM

## 2024-02-27 DIAGNOSIS — R74.01 TRANSAMINITIS: ICD-10-CM

## 2024-02-27 DIAGNOSIS — D64.9 ANEMIA, UNSPECIFIED TYPE: ICD-10-CM

## 2024-02-27 DIAGNOSIS — D72.821 MONOCYTOSIS: ICD-10-CM

## 2024-02-27 DIAGNOSIS — L29.9 PRURITUS: Primary | ICD-10-CM

## 2024-02-27 DIAGNOSIS — R79.89 ABNORMAL LFTS: ICD-10-CM

## 2024-02-27 DIAGNOSIS — L98.491: ICD-10-CM

## 2024-02-27 LAB
HFE GENE MUT ANL BLD/T: NORMAL
HFE P.C282Y BLD/T QL: NEGATIVE
HFE P.H63D BLD/T QL: NEGATIVE
HFE P.S65C BLD/T QL: NEGATIVE
SPECIMEN SOURCE: NORMAL

## 2024-02-27 PROCEDURE — 99214 OFFICE O/P EST MOD 30 MIN: CPT | Performed by: INTERNAL MEDICINE

## 2024-02-27 RX ORDER — CETIRIZINE HYDROCHLORIDE 10 MG/1
10 TABLET ORAL DAILY
Qty: 30 TABLET | Refills: 0 | Status: SHIPPED | OUTPATIENT
Start: 2024-02-27

## 2024-02-27 NOTE — PROGRESS NOTES
Adrian Horne  Patient's  is 1970  Seen in office on 2024      SUBJECTIVE:  Adrian rosado 53 y.o.year old male presents today   Chief Complaint   Patient presents with    Follow-up    Other     Itching all over for 2 months   Pain on buttox      Generalized itching all over the body.  Patient had itching for several months.  It has subsided but ocurred again.  Sup ulcers on the perirectal area for 2 weeks.  Denies any parasitic infection. No diarrhea.    Pt states he wakes up with itching and develops hives     Pt has elevated ferritin : negative for hemochromatosis       Anemia  : UGI showed gastric wall thickening and anemia .  Patient was referred to the EDhi EGD showed gastritis and edema. Colonoscopy not done as he did not take prep. Bx : chronic gastritis     Pt has mild elevation of LFTs : w/u negative except elevated ferritin     Taking medications regularly. No side effects noted.    Review of Systems  Review of system is normal except as in HPI    OBJECTIVE: /70 (Site: Right Upper Arm, Position: Sitting, Cuff Size: Medium Adult)   Pulse 98   Temp 98 °F (36.7 °C) (Oral)   Resp 20   Wt 58.6 kg (129 lb 3.2 oz)   SpO2 99%   BMI 18.54 kg/m²     Wt Readings from Last 3 Encounters:   24 58.6 kg (129 lb 3.2 oz)   24 54.4 kg (120 lb)   23 54.4 kg (120 lb)      GENERAL: - Alert, oriented, pleasant, in no apparent distress.    HEENT: - Conjunctiva pink, no scleral icterus. ENT clear.  NECK: -Supple.  No jugular venous distention noted. No masses felt,  CARDIOVASCULAR: - Normal S1 and S2    PULMONARY: - No respiratory distress.  No wheezes or rales.    ABDOMEN: - Soft and non-tender,no masses  ororganomegaly.  EXTREMITIES: - No cyanosis, clubbing, or significant edema.  SKIN: Skin is warm and dry.   NEUROLOGICAL: - Cranial nerves II through XII are grossly intact.      IMPRESSION:    Encounter Diagnoses   Name Primary?    Pruritus Yes    Elevated ferritin level

## 2024-03-03 PROBLEM — L98.491: Status: ACTIVE | Noted: 2024-03-03

## 2024-03-04 ENCOUNTER — HOSPITAL ENCOUNTER (OUTPATIENT)
Age: 54
Discharge: HOME | End: 2024-03-04
Payer: COMMERCIAL

## 2024-03-04 LAB — TSH SERPL DL<=0.005 MIU/L-ACNC: 8.15 UIU/ML (ref 0.27–4.2)

## 2024-03-04 PROCEDURE — 84443 ASSAY THYROID STIM HORMONE: CPT

## 2024-03-04 PROCEDURE — 87177 OVA AND PARASITES SMEARS: CPT

## 2024-03-04 PROCEDURE — 36415 COLL VENOUS BLD VENIPUNCTURE: CPT

## 2024-03-04 PROCEDURE — 87209 SMEAR COMPLEX STAIN: CPT

## 2024-03-13 ENCOUNTER — HOSPITAL ENCOUNTER (OUTPATIENT)
Dept: INFUSION THERAPY | Age: 54
Discharge: HOME | End: 2024-03-13
Payer: COMMERCIAL

## 2024-03-13 ENCOUNTER — INITIAL CONSULT (OUTPATIENT)
Dept: ONCOLOGY | Age: 54
End: 2024-03-13
Payer: COMMERCIAL

## 2024-03-13 VITALS
OXYGEN SATURATION: 98 % | TEMPERATURE: 98 F | WEIGHT: 131 LBS | HEART RATE: 95 BPM | HEIGHT: 70 IN | DIASTOLIC BLOOD PRESSURE: 73 MMHG | SYSTOLIC BLOOD PRESSURE: 148 MMHG | BODY MASS INDEX: 18.75 KG/M2

## 2024-03-13 DIAGNOSIS — D64.9 ANEMIA, UNSPECIFIED TYPE: ICD-10-CM

## 2024-03-13 DIAGNOSIS — L29.9 PRURITUS: ICD-10-CM

## 2024-03-13 DIAGNOSIS — L29.9 PRURITUS: Primary | ICD-10-CM

## 2024-03-13 LAB
ALBUMIN SERPL-MCNC: 4 GM/DL (ref 3.4–5)
ALP BLD-CCNC: 91 IU/L (ref 40–129)
ALT SERPL-CCNC: 28 U/L (ref 10–40)
ANION GAP SERPL CALCULATED.3IONS-SCNC: 9 MMOL/L (ref 7–16)
AST SERPL-CCNC: 39 IU/L (ref 15–37)
BASOPHILS ABSOLUTE: 0 K/CU MM
BASOPHILS RELATIVE PERCENT: 0.2 % (ref 0–1)
BILIRUB SERPL-MCNC: 0.2 MG/DL (ref 0–1)
BUN SERPL-MCNC: 17 MG/DL (ref 6–23)
CALCIUM SERPL-MCNC: 9.3 MG/DL (ref 8.3–10.6)
CHLORIDE BLD-SCNC: 105 MMOL/L (ref 99–110)
CO2: 26 MMOL/L (ref 21–32)
CREAT SERPL-MCNC: 1.3 MG/DL (ref 0.9–1.3)
DIFFERENTIAL TYPE: ABNORMAL
EOSINOPHILS ABSOLUTE: 0.9 K/CU MM
EOSINOPHILS RELATIVE PERCENT: 16.9 % (ref 0–3)
ERYTHROCYTE SEDIMENTATION RATE: 18 MM/HR (ref 0–20)
GFR SERPL CREATININE-BSD FRML MDRD: >60 ML/MIN/1.73M2
GLUCOSE SERPL-MCNC: 96 MG/DL (ref 70–99)
HCT VFR BLD CALC: 34.2 % (ref 42–52)
HEMOGLOBIN: 11.1 GM/DL (ref 13.5–18)
HIV 1+2 AB+HIV1P24 AG SERPLBLD IA.RAPID: NORMAL
LACTATE DEHYDROGENASE: 265 IU/L (ref 120–246)
LYMPHOCYTES ABSOLUTE: 2 K/CU MM
LYMPHOCYTES RELATIVE PERCENT: 39.3 % (ref 24–44)
MCH RBC QN AUTO: 30 PG (ref 27–31)
MCHC RBC AUTO-ENTMCNC: 32.5 % (ref 32–36)
MCV RBC AUTO: 92.4 FL (ref 78–100)
MONOCYTES ABSOLUTE: 0.6 K/CU MM
MONOCYTES RELATIVE PERCENT: 12.4 % (ref 0–4)
PDW BLD-RTO: 14.8 % (ref 11.7–14.9)
PLATELET # BLD: 239 K/CU MM (ref 140–440)
PMV BLD AUTO: 9.9 FL (ref 7.5–11.1)
POTASSIUM SERPL-SCNC: 4.1 MMOL/L (ref 3.5–5.1)
RBC # BLD: 3.7 M/CU MM (ref 4.6–6.2)
RETICULOCYTE COUNT PCT: 1.6 % (ref 0.2–2.2)
SEGMENTED NEUTROPHILS ABSOLUTE COUNT: 1.6 K/CU MM
SEGMENTED NEUTROPHILS RELATIVE PERCENT: 31.2 % (ref 36–66)
SODIUM BLD-SCNC: 140 MMOL/L (ref 135–145)
TOTAL PROTEIN: 7.3 GM/DL (ref 6.4–8.2)
WBC # BLD: 5.2 K/CU MM (ref 4–10.5)

## 2024-03-13 PROCEDURE — 83010 ASSAY OF HAPTOGLOBIN QUANT: CPT

## 2024-03-13 PROCEDURE — 80053 COMPREHEN METABOLIC PANEL: CPT

## 2024-03-13 PROCEDURE — 99211 OFF/OP EST MAY X REQ PHY/QHP: CPT

## 2024-03-13 PROCEDURE — 85025 COMPLETE CBC W/AUTO DIFF WBC: CPT

## 2024-03-13 PROCEDURE — 86702 HIV-2 ANTIBODY: CPT

## 2024-03-13 PROCEDURE — 99204 OFFICE O/P NEW MOD 45 MIN: CPT | Performed by: INTERNAL MEDICINE

## 2024-03-13 PROCEDURE — 36415 COLL VENOUS BLD VENIPUNCTURE: CPT

## 2024-03-13 PROCEDURE — 86320 SERUM IMMUNOELECTROPHORESIS: CPT

## 2024-03-13 PROCEDURE — 85045 AUTOMATED RETICULOCYTE COUNT: CPT

## 2024-03-13 PROCEDURE — 84155 ASSAY OF PROTEIN SERUM: CPT

## 2024-03-13 PROCEDURE — 87389 HIV-1 AG W/HIV-1&-2 AB AG IA: CPT

## 2024-03-13 PROCEDURE — 85652 RBC SED RATE AUTOMATED: CPT

## 2024-03-13 PROCEDURE — 84165 PROTEIN E-PHORESIS SERUM: CPT

## 2024-03-13 PROCEDURE — 83615 LACTATE (LD) (LDH) ENZYME: CPT

## 2024-03-13 PROCEDURE — 86701 HIV-1ANTIBODY: CPT

## 2024-03-13 RX ORDER — PERMETHRIN 50 MG/G
CREAM TOPICAL
Qty: 240 G | Refills: 1 | Status: SHIPPED | OUTPATIENT
Start: 2024-03-13

## 2024-03-13 NOTE — PROGRESS NOTES
MA Rooming Questions  Patient: Adrian Horne  MRN: [id number]    Date: 3/13/2024        NEW PATIENT     Wt Readings from Last 3 Encounters:   03/13/24 59.4 kg (131 lb)   02/27/24 58.6 kg (129 lb 3.2 oz)   02/01/24 54.4 kg (120 lb)      5. Did the patient have a depression screening completed today? No    No data recorded     PHQ-9 Given to (if applicable):               PHQ-9 Score (if applicable):                     [] Positive     []  Negative              Does question #9 need addressed (if applicable)                     [] Yes    []  No               Mady Toure, [facility]    
anxiety, Concentration normal.  Endocrine:  No polyuria, No polydipsia, No hot flashes, No thyroid symptoms.  Hematologic:  No epistaxis, No gingival bleeding, No petechiae, No ecchymosis.  Lymphatic:  No lymphadenopathy, No lymphedema.  Allergy / Immunologic:  No eczema, No frequent mucous infections, No frequent respiratory infections, No recurrent urticarial, No frequent skin infections.     Vital Signs: BP (!) 148/73 (Site: Right Upper Arm, Position: Sitting, Cuff Size: Medium Adult)   Pulse 95   Temp 98 °F (36.7 °C) (Temporal)   Ht 1.778 m (5' 10\")   Wt 59.4 kg (131 lb)   SpO2 98%   BMI 18.80 kg/m²      Physical Exam:  CONSTITUTIONAL: awake, alert, cooperative, no apparent distress   EYES: pupils equal, round and reactive to light, sclera clear, normal conjunctiva  ENT: Normocephalic, without obvious abnormality, atraumatic  NECK: supple, symmetrical, no jugular venous distension, no carotid bruits   HEMATOLOGIC/LYMPHATIC: no cervical, supraclavicular or axillary lymphadenopathy   LUNGS: VBS, no wheezes, no increased work of breathing, no rhonchi, clear to auscultation, no crackles,    CARDIOVASCULAR: regular rate and rhythm, normal S1 and S2, no murmur noted  ABDOMEN: normal bowel sounds x 4, soft, non-distended, non-tender, no masses palpated, no hepatosplenomegaly   MUSCULOSKELETAL: full range of motion noted, tone is normal  NEUROLOGIC: awake, alert, oriented to name, place and time. Motor skills grossly intact.   SKIN: appears intact, normal skin color, normal texture, normal turgor, no jaundice.   EXTREMITIES: no LE edema, no leg swelling, no cyanosis, no clubbing,       Labs:  Hematology:  Lab Results   Component Value Date    WBC 5.2 03/13/2024    RBC 3.70 (L) 03/13/2024    HGB 11.1 (L) 03/13/2024    HCT 34.2 (L) 03/13/2024    MCV 92.4 03/13/2024    MCH 30.0 03/13/2024    MCHC 32.5 03/13/2024    RDW 14.8 03/13/2024     03/13/2024    MPV 9.9 03/13/2024    SEGSPCT 31.2 (L) 03/13/2024

## 2024-03-15 LAB
ALBUMIN SERPL ELPH-MCNC: 3.6 GM/DL (ref 3.2–5.6)
ALPHA-1-GLOBULIN: 0.3 GM/DL (ref 0.1–0.4)
ALPHA-2-GLOBULIN: 0.7 GM/DL (ref 0.4–1.2)
BETA GLOBULIN: 1.2 GM/DL (ref 0.5–1.3)
GAMMA GLOBULIN: 1.5 GM/DL (ref 0.5–1.6)
HAPTOGLOB SERPL-MCNC: 160 MG/DL (ref 30–200)
SPEP INTERPRETATION: NORMAL
SPEP INTERPRETATION: NORMAL
TOTAL PROTEIN: 7.3 GM/DL (ref 6.4–8.2)

## 2024-03-16 LAB
HIV 1 & 2 AB SERPLBLD IA.RAPID: ABNORMAL
HIV 1+2 AB+HIV1 P24 AG SERPL QL IA: REACTIVE
HIV 2 AB SERPLBLD QL IA.RAPID: NEGATIVE
HIV1 AB SERPLBLD QL IA.RAPID: POSITIVE
SERVICE CMNT-IMP: ABNORMAL

## 2024-03-18 ENCOUNTER — TELEPHONE (OUTPATIENT)
Dept: ONCOLOGY | Age: 54
End: 2024-03-18

## 2024-03-19 ENCOUNTER — CLINICAL DOCUMENTATION (OUTPATIENT)
Dept: ONCOLOGY | Age: 54
End: 2024-03-19

## 2024-03-19 NOTE — PROGRESS NOTES
Language Services Session Code 43156 - called pt via  to inform that Dr. Jefferson would like to see him this week in regards to recent lab values. Pt did not answer and vm not set up. Called pt's contact/girlfriend. No answer and no vm set up at that number. This PCC will attempt to reach pt again later today.     Language Services Session Code 56082 - same results as above.

## 2024-03-26 ENCOUNTER — PATIENT OUTREACH (OUTPATIENT)
Dept: ONCOLOGY | Age: 54
End: 2024-03-26

## 2024-03-26 NOTE — CONSULTS
Session ID: [id number]  Language: Z[place]ian Creole   ID: #[id number]   Name: Cary    Attempted to reach patient at his number and his girlfriend's number to schedule patient for office visit. No answer and no voicemail set up at either number.

## 2024-04-05 RX ORDER — LEVOTHYROXINE SODIUM 0.03 MG/1
25 TABLET ORAL DAILY
Qty: 90 TABLET | Refills: 1 | Status: SHIPPED | OUTPATIENT
Start: 2024-04-05

## 2024-04-11 ENCOUNTER — CLINICAL DOCUMENTATION (OUTPATIENT)
Dept: ONCOLOGY | Age: 54
End: 2024-04-11

## 2024-04-12 ENCOUNTER — OFFICE VISIT (OUTPATIENT)
Dept: ONCOLOGY | Age: 54
End: 2024-04-12
Payer: COMMERCIAL

## 2024-04-12 ENCOUNTER — HOSPITAL ENCOUNTER (OUTPATIENT)
Dept: INFUSION THERAPY | Age: 54
Discharge: HOME | End: 2024-04-12
Payer: COMMERCIAL

## 2024-04-12 VITALS
BODY MASS INDEX: 18.84 KG/M2 | DIASTOLIC BLOOD PRESSURE: 74 MMHG | SYSTOLIC BLOOD PRESSURE: 132 MMHG | TEMPERATURE: 98 F | HEIGHT: 70 IN | WEIGHT: 131.6 LBS | OXYGEN SATURATION: 100 % | HEART RATE: 81 BPM

## 2024-04-12 DIAGNOSIS — B20 HIV INFECTION, UNSPECIFIED SYMPTOM STATUS (HCC): Primary | ICD-10-CM

## 2024-04-12 PROCEDURE — 99213 OFFICE O/P EST LOW 20 MIN: CPT | Performed by: INTERNAL MEDICINE

## 2024-04-12 PROCEDURE — 99211 OFF/OP EST MAY X REQ PHY/QHP: CPT

## 2024-04-12 NOTE — PROGRESS NOTES
Patient Name:  Adrian Horne  Patient :  1970  Patient MRN:  [id number]     Primary Oncologist: Guy Jefferson MD  Referring Provider: Fer Amaya MD     Date of Service: 2024      Chief Complaint:    Chief Complaint   Patient presents with    Follow-up     Patient Active Problem List:     Anemia     Abnormal LFTs     Transaminitis     FRANCISCO (acute kidney injury) (HCC)     Abnormal CT scan, stomach     Monocytosis     Elevated ferritin level     Pruritus     Skin ulcer of perirectal region, limited to breakdown of skin (HCC)    HPI:   Adrian Horne is a 53 y.o. male with no pertinent medical history referred to me on 3/13/24 for evaluation of anemia, elevated serum ferritin level, relative monocytosis and relative eosinophilia.    He has been having itchiness for several months. He denies bed bug at home or parasitic infection.     He was noted to have elevated serum ferritin, however, hemochromatosis panel was negative.     Also noted to have mild anemia. UGI showed gastric wall thickening and anemia. EGD by Dr. Us showed gastritis and edema. Colonoscopy not done as he did not take prep. Bx : chronic gastritis.     He was also noted to have mild elevation in LFE. All the work ups, including hepatitis panel and autoimmune hepatitis panel were negative.     In view of his anemia, elevated ferritin with relative monocytosis and eosinophilia, he was referred to me for further evaluation.     Laboratory work ups done on 3/13/24 showed positive HIV-1 antibody, relative increased in eosinophil and monocytes. Absolute eosinophil and monocyte counts were normal. The rests of the blood tests, including LDH, ESR, reticulocyte count, haptoglobin, SPEP and immunofixation were also normal.     On 2024, he presented to me for follow up. Reviewed findings on labs done on 3/13/24.     Besides itchiness, he doesn't have any significant symptoms on today visit.     Past Medical History:     No past medical

## 2024-04-12 NOTE — PROGRESS NOTES
MA Rooming Questions  Patient: Adrian Horne  MRN: [id number]    Date: 4/12/2024        1. Do you have any new issues?   Yes- Itchiness; states the permethrin cream is not helping. States is still uncomfortable. States has a few bumps all over his body. States even after putting the cream on the bumps are still bothersome.     2. Do you need any refills on medications?    no    3. Have you had any imaging done since your last visit?   no    4. Have you been hospitalized or seen in the emergency room since your last visit here?   no    5. Did the patient have a depression screening completed today? No    No data recorded     PHQ-9 Given to (if applicable):               PHQ-9 Score (if applicable):                     [] Positive     []  Negative              Does question #9 need addressed (if applicable)                     [] Yes    []  No               Radha Kellogg MA

## 2024-04-17 ENCOUNTER — HOSPITAL ENCOUNTER (EMERGENCY)
Age: 54
Discharge: HOME | End: 2024-04-17
Attending: EMERGENCY MEDICINE
Payer: COMMERCIAL

## 2024-04-17 ENCOUNTER — APPOINTMENT (OUTPATIENT)
Dept: CT IMAGING | Age: 54
End: 2024-04-17
Payer: COMMERCIAL

## 2024-04-17 VITALS
BODY MASS INDEX: 18.75 KG/M2 | HEART RATE: 96 BPM | TEMPERATURE: 98.4 F | DIASTOLIC BLOOD PRESSURE: 76 MMHG | SYSTOLIC BLOOD PRESSURE: 128 MMHG | OXYGEN SATURATION: 100 % | HEIGHT: 70 IN | WEIGHT: 131 LBS | RESPIRATION RATE: 12 BRPM

## 2024-04-17 DIAGNOSIS — R21 RASH AND OTHER NONSPECIFIC SKIN ERUPTION: Primary | ICD-10-CM

## 2024-04-17 DIAGNOSIS — R22.0 FACIAL SWELLING: ICD-10-CM

## 2024-04-17 DIAGNOSIS — B20 HISTORY OF HIV INFECTION (HCC): ICD-10-CM

## 2024-04-17 LAB
ALBUMIN SERPL-MCNC: 4.2 GM/DL (ref 3.4–5)
ALP BLD-CCNC: 80 IU/L (ref 40–128)
ALT SERPL-CCNC: 22 U/L (ref 10–40)
ANION GAP SERPL CALCULATED.3IONS-SCNC: 10 MMOL/L (ref 7–16)
AST SERPL-CCNC: 30 IU/L (ref 15–37)
BASOPHILS ABSOLUTE: 0 K/CU MM
BASOPHILS RELATIVE PERCENT: 0.3 % (ref 0–1)
BILIRUB SERPL-MCNC: 0.2 MG/DL (ref 0–1)
BUN SERPL-MCNC: 15 MG/DL (ref 6–23)
CALCIUM SERPL-MCNC: 8.9 MG/DL (ref 8.3–10.6)
CHLORIDE BLD-SCNC: 103 MMOL/L (ref 99–110)
CO2: 24 MMOL/L (ref 21–32)
CREAT SERPL-MCNC: 1.3 MG/DL (ref 0.9–1.3)
CRP SERPL HS-MCNC: 3 MG/L
DIFFERENTIAL TYPE: ABNORMAL
EOSINOPHILS ABSOLUTE: 1.1 K/CU MM
EOSINOPHILS RELATIVE PERCENT: 16.7 % (ref 0–3)
ERYTHROCYTE SEDIMENTATION RATE: 11 MM/HR (ref 0–20)
GFR SERPL CREATININE-BSD FRML MDRD: 66 ML/MIN/1.73M2
GLUCOSE SERPL-MCNC: 94 MG/DL (ref 70–99)
HCT VFR BLD CALC: 36.3 % (ref 42–52)
HEMOGLOBIN: 11.7 GM/DL (ref 13.5–18)
IMMATURE NEUTROPHIL %: 0.6 % (ref 0–0.43)
LACTATE: 1.3 MMOL/L (ref 0.5–1.9)
LYMPHOCYTES ABSOLUTE: 2.1 K/CU MM
LYMPHOCYTES RELATIVE PERCENT: 31 % (ref 24–44)
MCH RBC QN AUTO: 29.9 PG (ref 27–31)
MCHC RBC AUTO-ENTMCNC: 32.2 % (ref 32–36)
MCV RBC AUTO: 92.8 FL (ref 78–100)
MONOCYTES ABSOLUTE: 0.8 K/CU MM
MONOCYTES RELATIVE PERCENT: 11.1 % (ref 0–4)
NEUTROPHILS RELATIVE PERCENT: 40.3 % (ref 36–66)
NUCLEATED RBC %: 0 %
PDW BLD-RTO: 13.6 % (ref 11.7–14.9)
PLATELET # BLD: 230 K/CU MM (ref 140–440)
PMV BLD AUTO: 10.2 FL (ref 7.5–11.1)
POTASSIUM SERPL-SCNC: 3.7 MMOL/L (ref 3.5–5.1)
RBC # BLD: 3.91 M/CU MM (ref 4.6–6.2)
SEGMENTED NEUTROPHILS ABSOLUTE COUNT: 2.8 K/CU MM
SODIUM BLD-SCNC: 137 MMOL/L (ref 135–145)
TOTAL IMMATURE NEUTOROPHIL: 0.04 K/CU MM
TOTAL NUCLEATED RBC: 0 K/CU MM
TOTAL PROTEIN: 8.1 GM/DL (ref 6.4–8.2)
WBC # BLD: 6.8 K/CU MM (ref 4–10.5)

## 2024-04-17 PROCEDURE — 80053 COMPREHEN METABOLIC PANEL: CPT

## 2024-04-17 PROCEDURE — 2580000003 HC RX 258: Performed by: EMERGENCY MEDICINE

## 2024-04-17 PROCEDURE — 88185 FLOWCYTOMETRY/TC ADD-ON: CPT

## 2024-04-17 PROCEDURE — 86140 C-REACTIVE PROTEIN: CPT

## 2024-04-17 PROCEDURE — 70450 CT HEAD/BRAIN W/O DYE: CPT

## 2024-04-17 PROCEDURE — 96374 THER/PROPH/DIAG INJ IV PUSH: CPT

## 2024-04-17 PROCEDURE — 85652 RBC SED RATE AUTOMATED: CPT

## 2024-04-17 PROCEDURE — 99284 EMERGENCY DEPT VISIT MOD MDM: CPT

## 2024-04-17 PROCEDURE — 6360000002 HC RX W HCPCS: Performed by: EMERGENCY MEDICINE

## 2024-04-17 PROCEDURE — 85025 COMPLETE CBC W/AUTO DIFF WBC: CPT

## 2024-04-17 PROCEDURE — 88184 FLOWCYTOMETRY/ TC 1 MARKER: CPT

## 2024-04-17 PROCEDURE — 2500000003 HC RX 250 WO HCPCS: Performed by: EMERGENCY MEDICINE

## 2024-04-17 PROCEDURE — 96375 TX/PRO/DX INJ NEW DRUG ADDON: CPT

## 2024-04-17 PROCEDURE — 83605 ASSAY OF LACTIC ACID: CPT

## 2024-04-17 RX ORDER — DIPHENHYDRAMINE HCL 25 MG
25 CAPSULE ORAL EVERY 6 HOURS PRN
Qty: 20 CAPSULE | Refills: 0 | Status: SHIPPED | OUTPATIENT
Start: 2024-04-17 | End: 2024-04-27

## 2024-04-17 RX ORDER — ACETAMINOPHEN 325 MG/1
650 TABLET ORAL EVERY 6 HOURS PRN
Qty: 120 TABLET | Refills: 3 | Status: SHIPPED | OUTPATIENT
Start: 2024-04-17

## 2024-04-17 RX ORDER — DIPHENHYDRAMINE HYDROCHLORIDE 50 MG/ML
50 INJECTION INTRAMUSCULAR; INTRAVENOUS ONCE
Status: COMPLETED | OUTPATIENT
Start: 2024-04-17 | End: 2024-04-17

## 2024-04-17 RX ORDER — NAPROXEN 500 MG/1
500 TABLET ORAL 2 TIMES DAILY
Qty: 60 TABLET | Refills: 0 | Status: SHIPPED | OUTPATIENT
Start: 2024-04-17

## 2024-04-17 RX ORDER — FAMOTIDINE 20 MG/1
20 TABLET, FILM COATED ORAL DAILY
Qty: 14 TABLET | Refills: 0 | Status: SHIPPED | OUTPATIENT
Start: 2024-04-17 | End: 2024-04-24

## 2024-04-17 RX ORDER — FAMOTIDINE 10 MG/ML
20 INJECTION, SOLUTION INTRAVENOUS ONCE
Status: COMPLETED | OUTPATIENT
Start: 2024-04-17 | End: 2024-04-17

## 2024-04-17 RX ORDER — PREDNISONE 10 MG/1
60 TABLET ORAL DAILY
Qty: 36 TABLET | Refills: 0 | Status: SHIPPED | OUTPATIENT
Start: 2024-04-18 | End: 2024-04-24

## 2024-04-17 RX ORDER — 0.9 % SODIUM CHLORIDE 0.9 %
1000 INTRAVENOUS SOLUTION INTRAVENOUS ONCE
Status: COMPLETED | OUTPATIENT
Start: 2024-04-17 | End: 2024-04-17

## 2024-04-17 RX ADMIN — FAMOTIDINE 20 MG: 10 INJECTION, SOLUTION INTRAVENOUS at 07:48

## 2024-04-17 RX ADMIN — DIPHENHYDRAMINE HYDROCHLORIDE 50 MG: 50 INJECTION, SOLUTION INTRAMUSCULAR; INTRAVENOUS at 07:48

## 2024-04-17 RX ADMIN — SODIUM CHLORIDE 1000 ML: 9 INJECTION, SOLUTION INTRAVENOUS at 07:48

## 2024-04-17 RX ADMIN — METHYLPREDNISOLONE SODIUM SUCCINATE 125 MG: 125 INJECTION, POWDER, FOR SOLUTION INTRAMUSCULAR; INTRAVENOUS at 07:48

## 2024-04-17 ASSESSMENT — ENCOUNTER SYMPTOMS
GASTROINTESTINAL NEGATIVE: 1
EYES NEGATIVE: 1
RESPIRATORY NEGATIVE: 1
ALLERGIC/IMMUNOLOGIC NEGATIVE: 1
FACIAL SWELLING: 1

## 2024-04-17 ASSESSMENT — PAIN DESCRIPTION - LOCATION: LOCATION: HEAD

## 2024-04-17 ASSESSMENT — PAIN SCALES - GENERAL: PAINLEVEL_OUTOF10: 1

## 2024-04-17 NOTE — ED PROVIDER NOTES
[facility]      TRIAGE CHIEF COMPLAINT:   Facial Injury (Pt has a facial injury that runs above and below his right eye. Pt states it causes him pain and that it appeared this morning.) and Injury (Pt states he has a pressure injury on his bottom that causes him pain and itching sometimes.)      Robinson:  Adrian Hrone is a 53 y.o. male that presents with complaint of rash, facial swelling.  Patient states he was recently diagnosed with HIV he has seen oncology and he was supposed to see infectious disease but has not seen them yet.  He also was supposed to get a call to get a scheduled appointment he never got a call.  He has not started treatment for HIV he does not know CD4 count or viral load he has developed a rash she saw oncology a few days ago he was given treatment for scabies but not getting better he states it itches.  Denies any fevers nausea vomiting chest pain shortness of breath cough abdominal pain.  Has had a rash and has a slight headache and right-sided eyelid facial swelling.  No vision changes no slurred speech no sore throat etc no other questions or concerns.  Denies any bowel bladder symptoms..    REVIEW OF SYSTEMS:  At least 10 systems reviewed and otherwise acutely negative except as in the Robinson.    Review of Systems   Constitutional: Negative.    HENT:  Positive for facial swelling.    Eyes: Negative.    Respiratory: Negative.     Cardiovascular: Negative.    Gastrointestinal: Negative.    Endocrine: Negative.    Genitourinary: Negative.    Musculoskeletal: Negative.    Skin:  Positive for rash.   Allergic/Immunologic: Negative.    Neurological:  Positive for headaches.   Hematological: Negative.    Psychiatric/Behavioral: Negative.     All other systems reviewed and are negative.      No past medical history on file.  Past Surgical History:   Procedure Laterality Date    UPPER GASTROINTESTINAL ENDOSCOPY N/A 2/1/2024    EGD BIOPSY performed by Monique Us MD at  day        DISPOSITION MEDICATIONS (if applicable):  Discharge Medication List as of 4/17/2024 10:36 AM        START taking these medications    Details   famotidine (PEPCID) 20 MG tablet Take 1 tablet by mouth daily, Disp-14 tablet, R-0Normal      diphenhydrAMINE (BENADRYL) 25 MG capsule Take 1 capsule by mouth every 6 hours as needed for Itching or Allergies, Disp-20 capsule, R-0Normal      predniSONE (DELTASONE) 10 MG tablet Take 6 tablets by mouth daily for 6 days, Disp-36 tablet, R-0Normal      acetaminophen (TYLENOL) 325 MG tablet Take 2 tablets by mouth every 6 hours as needed for Pain, Disp-120 tablet, R-3Normal      naproxen (NAPROSYN) 500 MG tablet Take 1 tablet by mouth 2 times daily, Disp-60 tablet, R-0Normal                DO Steph Hull James, DO  04/17/24 5216

## 2024-04-17 NOTE — ED NOTES
Pt noticed swelling & itching with slight pain to forehead for about 4 days. Pt states he has had something similar on the other side of his head that was an infection in the past

## 2024-04-19 LAB
CD3 CELLS # BLD: 1757 CELLS/UL (ref 570–2400)
CD3 CELLS NFR SPEC: 82 % (ref 62–87)
CD3+CD4+ CELLS # BLD: 9 CELLS/UL (ref 430–1800)
CD3+CD4+ CELLS NFR BLD: 0 % (ref 32–64)
CD3+CD4+ CELLS/CD3+CD8+ CLL BLD: 0 RATIO (ref 0.8–3.9)
CD3+CD8+ CELLS # BLD: 1676 CELLS/UL (ref 210–1200)
CD3+CD8+ CELLS NFR SPEC: 78 % (ref 15–46)

## 2024-04-24 ENCOUNTER — OFFICE VISIT (OUTPATIENT)
Dept: GASTROENTEROLOGY | Age: 54
End: 2024-04-24
Payer: COMMERCIAL

## 2024-04-24 ENCOUNTER — PREP FOR PROCEDURE (OUTPATIENT)
Dept: GASTROENTEROLOGY | Age: 54
End: 2024-04-24

## 2024-04-24 VITALS
BODY MASS INDEX: 19.18 KG/M2 | SYSTOLIC BLOOD PRESSURE: 120 MMHG | WEIGHT: 134 LBS | HEIGHT: 70 IN | DIASTOLIC BLOOD PRESSURE: 66 MMHG | TEMPERATURE: 98.4 F | HEART RATE: 62 BPM

## 2024-04-24 DIAGNOSIS — Z12.11 COLON CANCER SCREENING: ICD-10-CM

## 2024-04-24 DIAGNOSIS — D64.9 NORMOCYTIC ANEMIA: ICD-10-CM

## 2024-04-24 DIAGNOSIS — K29.00 ACUTE SUPERFICIAL GASTRITIS WITHOUT HEMORRHAGE: Primary | ICD-10-CM

## 2024-04-24 PROCEDURE — 99213 OFFICE O/P EST LOW 20 MIN: CPT | Performed by: INTERNAL MEDICINE

## 2024-04-24 NOTE — PROGRESS NOTES
[facility] Gastroenterology and Hepatology             MD Monique Thomas MD Carol Christensen, APRN-CNP       Yolande Mishra, APRN-CNP             [address] [place] [address]             [phone] fax [phone]        Gastroenterology Clinic Consultation    Monique Us MD  Encounter Date: 04/24/24     CC: Follow Up After Procedure       Patient was referred by Dr. Amaya for abnormal CT scan and anemia.      History obtained from: patient, family, medical records.         Subjective:      Adrian Horne is an 53 y.o. male who presents for Follow Up After Procedure  4/24/2024  Patient was initially evaluated by me back in December and was scheduled for an upper GI as well as an colonoscopy.  He was noted to have gastric wall thickening and anemia.  He did not do his prep and thus only underwent an upper endoscopy which revealed gastritis, biopsies showed chronic gastritis H. pylori negative.  He was noted to have positive HIV-1 antibody on follow-up with hematology and was referred to ID.      12/5/2023  Patient is here for anemia.  Hemoglobin is noted to be low at 12.5 on 9/5/2023 and it dropped on 9/29/23 to 9.7.  His most recent hemoglobin is 13.0 on 11/27/2023.  AST slightly elevated at 40. he has been followed Patient reports that his stools are a bristol 3.  His iron studies done on iron supplementation on 11/27/2023 noted to be with serum iron of 82, TIBC 281, percent saturation 29 and ferritin 1434.  LFTs were noted to be normal except for mild elevation in AST.  BIANCA, AMA, ASMA, acute hepatitis panel all negative.     Patient currently denies any significant GI issues.  He does endorse and using NSAID use.  No family history of colon cancer. He denies any nausea, vomiting, dysphagia, melena or hematochezia.      Patient Active Problem List   Diagnosis    Anemia    Abnormal LFTs    Transaminitis    FRANCISCO (acute kidney injury)

## 2024-04-26 RX ORDER — SODIUM CHLORIDE 9 MG/ML
INJECTION, SOLUTION INTRAVENOUS PRN
OUTPATIENT
Start: 2024-04-26

## 2024-04-26 RX ORDER — SODIUM CHLORIDE 0.9 % (FLUSH) 0.9 %
5-40 SYRINGE (ML) INJECTION EVERY 12 HOURS SCHEDULED
OUTPATIENT
Start: 2024-04-26

## 2024-04-26 RX ORDER — SODIUM CHLORIDE 0.9 % (FLUSH) 0.9 %
5-40 SYRINGE (ML) INJECTION PRN
OUTPATIENT
Start: 2024-04-26

## 2024-04-27 ENCOUNTER — HOSPITAL ENCOUNTER (EMERGENCY)
Age: 54
Discharge: HOME | End: 2024-04-27
Attending: EMERGENCY MEDICINE
Payer: COMMERCIAL

## 2024-04-27 ENCOUNTER — APPOINTMENT (OUTPATIENT)
Dept: CT IMAGING | Age: 54
End: 2024-04-27
Payer: COMMERCIAL

## 2024-04-27 VITALS
WEIGHT: 134 LBS | HEART RATE: 111 BPM | BODY MASS INDEX: 19.18 KG/M2 | DIASTOLIC BLOOD PRESSURE: 81 MMHG | RESPIRATION RATE: 16 BRPM | OXYGEN SATURATION: 98 % | TEMPERATURE: 98.9 F | SYSTOLIC BLOOD PRESSURE: 127 MMHG | HEIGHT: 70 IN

## 2024-04-27 DIAGNOSIS — L98.491 SUPERFICIAL ULCER OF SKIN (HCC): Primary | ICD-10-CM

## 2024-04-27 LAB
ALBUMIN SERPL-MCNC: 4 GM/DL (ref 3.4–5)
ALP BLD-CCNC: 87 IU/L (ref 40–128)
ALT SERPL-CCNC: 28 U/L (ref 10–40)
ANION GAP SERPL CALCULATED.3IONS-SCNC: 11 MMOL/L (ref 7–16)
AST SERPL-CCNC: 26 IU/L (ref 15–37)
BASOPHILS ABSOLUTE: 0 K/CU MM
BASOPHILS RELATIVE PERCENT: 0.3 % (ref 0–1)
BILIRUB SERPL-MCNC: 0.2 MG/DL (ref 0–1)
BUN SERPL-MCNC: 18 MG/DL (ref 6–23)
CALCIUM SERPL-MCNC: 9 MG/DL (ref 8.3–10.6)
CHLORIDE BLD-SCNC: 106 MMOL/L (ref 99–110)
CO2: 26 MMOL/L (ref 21–32)
CREAT SERPL-MCNC: 1.4 MG/DL (ref 0.9–1.3)
DIFFERENTIAL TYPE: ABNORMAL
EOSINOPHILS ABSOLUTE: 1.2 K/CU MM
EOSINOPHILS RELATIVE PERCENT: 15.5 % (ref 0–3)
GFR SERPL CREATININE-BSD FRML MDRD: 60 ML/MIN/1.73M2
GLUCOSE SERPL-MCNC: 97 MG/DL (ref 70–99)
HCT VFR BLD CALC: 33.7 % (ref 42–52)
HEMOGLOBIN: 10.8 GM/DL (ref 13.5–18)
IMMATURE NEUTROPHIL %: 1.6 % (ref 0–0.43)
LYMPHOCYTES ABSOLUTE: 1.5 K/CU MM
LYMPHOCYTES RELATIVE PERCENT: 20.8 % (ref 24–44)
MCH RBC QN AUTO: 30.4 PG (ref 27–31)
MCHC RBC AUTO-ENTMCNC: 32 % (ref 32–36)
MCV RBC AUTO: 94.9 FL (ref 78–100)
MONOCYTES ABSOLUTE: 1 K/CU MM
MONOCYTES RELATIVE PERCENT: 13.6 % (ref 0–4)
NEUTROPHILS RELATIVE PERCENT: 48.2 % (ref 36–66)
NUCLEATED RBC %: 0 %
PDW BLD-RTO: 14.3 % (ref 11.7–14.9)
PLATELET # BLD: 282 K/CU MM (ref 140–440)
PMV BLD AUTO: 9.7 FL (ref 7.5–11.1)
POTASSIUM SERPL-SCNC: 4.4 MMOL/L (ref 3.5–5.1)
RBC # BLD: 3.55 M/CU MM (ref 4.6–6.2)
SEGMENTED NEUTROPHILS ABSOLUTE COUNT: 3.6 K/CU MM
SODIUM BLD-SCNC: 143 MMOL/L (ref 135–145)
TOTAL IMMATURE NEUTOROPHIL: 0.12 K/CU MM
TOTAL NUCLEATED RBC: 0 K/CU MM
TOTAL PROTEIN: 7.6 GM/DL (ref 6.4–8.2)
WBC # BLD: 7.4 K/CU MM (ref 4–10.5)

## 2024-04-27 PROCEDURE — 6360000004 HC RX CONTRAST MEDICATION: Performed by: EMERGENCY MEDICINE

## 2024-04-27 PROCEDURE — 99285 EMERGENCY DEPT VISIT HI MDM: CPT

## 2024-04-27 PROCEDURE — 85025 COMPLETE CBC W/AUTO DIFF WBC: CPT

## 2024-04-27 PROCEDURE — 6370000000 HC RX 637 (ALT 250 FOR IP): Performed by: EMERGENCY MEDICINE

## 2024-04-27 PROCEDURE — 80053 COMPREHEN METABOLIC PANEL: CPT

## 2024-04-27 PROCEDURE — 74177 CT ABD & PELVIS W/CONTRAST: CPT

## 2024-04-27 RX ORDER — CEPHALEXIN 500 MG/1
500 CAPSULE ORAL 4 TIMES DAILY
Qty: 28 CAPSULE | Refills: 0 | Status: SHIPPED | OUTPATIENT
Start: 2024-04-27 | End: 2024-05-04

## 2024-04-27 RX ORDER — CEPHALEXIN 250 MG/1
500 CAPSULE ORAL ONCE
Status: COMPLETED | OUTPATIENT
Start: 2024-04-27 | End: 2024-04-27

## 2024-04-27 RX ORDER — HYDROCODONE BITARTRATE AND ACETAMINOPHEN 5; 325 MG/1; MG/1
1 TABLET ORAL ONCE
Status: COMPLETED | OUTPATIENT
Start: 2024-04-27 | End: 2024-04-27

## 2024-04-27 RX ADMIN — IOPAMIDOL 75 ML: 755 INJECTION, SOLUTION INTRAVENOUS at 03:38

## 2024-04-27 RX ADMIN — HYDROCODONE BITARTRATE AND ACETAMINOPHEN 1 TABLET: 5; 325 TABLET ORAL at 04:35

## 2024-04-27 RX ADMIN — CEPHALEXIN 500 MG: 250 CAPSULE ORAL at 04:35

## 2024-04-27 ASSESSMENT — PAIN DESCRIPTION - LOCATION
LOCATION: BUTTOCKS
LOCATION: BUTTOCKS

## 2024-04-27 ASSESSMENT — PAIN SCALES - GENERAL
PAINLEVEL_OUTOF10: 5
PAINLEVEL_OUTOF10: 5

## 2024-04-27 ASSESSMENT — PAIN - FUNCTIONAL ASSESSMENT: PAIN_FUNCTIONAL_ASSESSMENT: 0-10

## 2024-04-27 NOTE — DISCHARGE INSTR - COC
Continuity of Care Form    Patient Name: Adrian Gunn   :  1970  MRN:  [id number]    Admit date:  2024  Discharge date:  ***    Code Status Order: Prior   Advance Directives:     Admitting Physician:  No admitting provider for patient encounter.  PCP: Fre Amaya MD    Discharging Nurse: ***  Discharging Hospital Unit/Room#: ED16/ED-16  Discharging Unit Phone Number: ***    Emergency Contact:   Extended Emergency Contact Information  Primary Emergency Contact: Yesenia Huff  Home Phone: [phone]  Mobile Phone: [phone]  Relation: Girlfriend  Secondary Emergency Contact: PHYLLIS GUNN  Home Phone: [phone]  Relation: Child   needed? No    Past Surgical History:  Past Surgical History:   Procedure Laterality Date    UPPER GASTROINTESTINAL ENDOSCOPY N/A 2024    EGD BIOPSY performed by Monique Us MD at [facility] ENDOSCOPY       Immunization History:     There is no immunization history on file for this patient.    Active Problems:  Patient Active Problem List   Diagnosis Code    Anemia D64.9    Abnormal LFTs R79.89    Transaminitis R74.01    FRANCISCO (acute kidney injury) ([facility]) N17.9    Abnormal CT scan, stomach R93.3    Monocytosis D72.821    Elevated ferritin level R79.89    Pruritus L29.9    Skin ulcer of perirectal region, limited to breakdown of skin ([facility]) L98.491       Isolation/Infection:   Isolation            No Isolation          Patient Infection Status       None to display                     Nurse Assessment:  Last Vital Signs: /81   Pulse (!) 111   Temp 98.9 °F (37.2 °C) (Oral)   Resp 16   Ht 1.778 m (5' 10\")   Wt 60.8 kg (134 lb)   SpO2 98%   BMI 19.23 kg/m²     Last documented pain score (0-10 scale): Pain Level: 5  Last Weight:   Wt Readings from Last 1 Encounters:   24 60.8 kg (134 lb)     Mental Status:  {IP PT MENTAL STATUS:}    IV Access:  { DEUCE IV ACCESS:[id number]}    Nursing Mobility/ADLs:  Walking   {[facility] DME

## 2024-04-27 NOTE — DISCHARGE INSTRUCTIONS
Your CT scan today did not show any deep wounds or deep infections.    Please follow-up with wound care for further evaluation and management.    Please try to use barrier creams as well as gauze or other nonadherent dressings to stop skin from touching other skin surfaces or clothing surfaces.    If you develop any worsening or concerning symptoms, please seek immediate medical evaluation

## 2024-04-27 NOTE — ED PROVIDER NOTES
[facility] EMERGENCY DEPARTMENT  EMERGENCY DEPARTMENT ENCOUNTER      Pt Name: Adrina Horne  MRN: [id number]  Birthdate 1970  Date of evaluation: 4/27/2024  Provider: Nakia Curran MD    CHIEF COMPLAINT       Chief Complaint   Patient presents with    Wound Check     buttocks         HISTORY OF PRESENT ILLNESS      Adrian Horne is a 53 y.o. male who presents to the emergency department  for   Chief Complaint   Patient presents with    Wound Check     buttocks       53-year-old male presents complaining of some rectal and buttock pain.  He has some known wounds on his rectum and reports that he is having more pain with those.  States he was prescribed a cream by his primary care physician several months ago.  He has recently been diagnosed with HIV.  He is on any antivirals at this time.  He is scheduled to see the infectious disease in a couple of weeks.  He denies any fevers.  Denies any rectal or buttock trauma.  Denies any bleeding or drainage from the area.  He denies any 70 constitutional infection symptoms.  He states the area is painful.          Nursing Notes, Triage Notes & Vital Signs were reviewed.      REVIEW OF SYSTEMS    (2-9 systems for level 4, 10 or more for level 5)     Review of Systems   Skin:         Skin ulcers on buttocks       Except as noted above the remainder of the review of systems was reviewed and negative.       PAST MEDICAL HISTORY   History reviewed. No pertinent past medical history.    Prior to Admission medications    Medication Sig Start Date End Date Taking? Authorizing Provider   mupirocin (BACTROBAN) 2 % ointment Apply topically 3 times daily. 4/27/24 5/4/24 Yes Nakia Smith MD   Zinc Oxide 10 % OINT Apply a thin film to affected area daily 4/27/24  Yes Nakia Smith MD   cephALEXin (KEFLEX) 500 MG capsule Take 1 capsule by mouth 4 times daily for 7 days 4/27/24 5/4/24 Yes Nakia Smith MD

## 2024-04-29 ENCOUNTER — OFFICE VISIT (OUTPATIENT)
Age: 54
End: 2024-04-29
Payer: COMMERCIAL

## 2024-04-29 VITALS
HEART RATE: 88 BPM | DIASTOLIC BLOOD PRESSURE: 72 MMHG | OXYGEN SATURATION: 99 % | SYSTOLIC BLOOD PRESSURE: 132 MMHG | WEIGHT: 137 LBS | TEMPERATURE: 97.3 F | BODY MASS INDEX: 19.66 KG/M2 | RESPIRATION RATE: 16 BRPM

## 2024-04-29 DIAGNOSIS — B20 HIV INFECTION, UNSPECIFIED SYMPTOM STATUS (HCC): Primary | ICD-10-CM

## 2024-04-29 DIAGNOSIS — L29.9 PRURITUS: ICD-10-CM

## 2024-04-29 DIAGNOSIS — R79.89 ABNORMAL LFTS: ICD-10-CM

## 2024-04-29 DIAGNOSIS — R74.01 TRANSAMINITIS: ICD-10-CM

## 2024-04-29 DIAGNOSIS — R79.89 ELEVATED FERRITIN LEVEL: ICD-10-CM

## 2024-04-29 DIAGNOSIS — D64.9 ANEMIA, UNSPECIFIED TYPE: ICD-10-CM

## 2024-04-29 DIAGNOSIS — L98.491: ICD-10-CM

## 2024-04-29 PROBLEM — Z21 HIV (HUMAN IMMUNODEFICIENCY VIRUS INFECTION) (HCC): Status: ACTIVE | Noted: 2024-04-29

## 2024-04-29 PROCEDURE — 99213 OFFICE O/P EST LOW 20 MIN: CPT | Performed by: INTERNAL MEDICINE

## 2024-04-29 ASSESSMENT — PATIENT HEALTH QUESTIONNAIRE - PHQ9
SUM OF ALL RESPONSES TO PHQ9 QUESTIONS 1 & 2: 0
SUM OF ALL RESPONSES TO PHQ QUESTIONS 1-9: 0
SUM OF ALL RESPONSES TO PHQ QUESTIONS 1-9: 0
1. LITTLE INTEREST OR PLEASURE IN DOING THINGS: NOT AT ALL
2. FEELING DOWN, DEPRESSED OR HOPELESS: NOT AT ALL
SUM OF ALL RESPONSES TO PHQ QUESTIONS 1-9: 0
SUM OF ALL RESPONSES TO PHQ QUESTIONS 1-9: 0

## 2024-04-29 NOTE — PROGRESS NOTES
Adrian Horne  Patient's  is 1970  Seen in office on 2024      SUBJECTIVE:  Adrian rosado 53 y.o.year old male presents today   Chief Complaint   Patient presents with    Follow-up    Other     Still itching all over     Patient is able to speak English and declined an   Patient is here for follow-up of itching, monocytosis, skin ulcers and abnormal liver function test  Patient was seen by Dr. Jefferson  He ordered an HIV screen that came back positive for HIV 1  He referred patient to ID  Referred  patient to infectious disease Dr. Ramirez and he has an appointment on May 16, 2024    Pt still has itching all over the body     Still has skin lesion on the buttocks. Pt is using antibiotic cream.  Patient was also on Keflex.    Pt has hypothyroidism and is on medication     Pt was sent to Dr Us for anemia and gastric wall thickening concerning for PUD. Pt on PPI  Had EGD done but pt did not get colonoscopy as he was not prep  Anemia could be from chronic disease    Taking medications regularly. No side effects noted.    Review of Systems  Review of system is normal except as in HPI    OBJECTIVE: /72   Pulse 88   Temp 97.3 °F (36.3 °C) (Temporal)   Resp 16   Wt 62.1 kg (137 lb)   SpO2 99%   BMI 19.66 kg/m²     Wt Readings from Last 3 Encounters:   24 62.1 kg (137 lb)   24 60.8 kg (134 lb)   24 60.8 kg (134 lb)      GENERAL: - Alert, oriented, pleasant, in no apparent distress.    HEENT: - Conjunctiva pink, no scleral icterus. ENT clear.  NECK: -Supple.  No jugular venous distention noted. No masses felt,  CARDIOVASCULAR: - Normal S1 and S2    PULMONARY: - No respiratory distress.  No wheezes or rales.    ABDOMEN: - Soft and non-tender,no masses  ororganomegaly.  EXTREMITIES: - No cyanosis, clubbing, or significant edema.  SKIN: Skin is warm and dry.   NEUROLOGICAL: - Cranial nerves II through XII are grossly intact.      IMPRESSION:    Encounter Diagnoses   Name Primary?

## 2024-05-16 ENCOUNTER — HOSPITAL ENCOUNTER (OUTPATIENT)
Dept: GENERAL RADIOLOGY | Age: 54
Discharge: HOME | End: 2024-05-16
Payer: COMMERCIAL

## 2024-05-16 ENCOUNTER — OFFICE VISIT (OUTPATIENT)
Dept: INFECTIOUS DISEASES | Age: 54
End: 2024-05-16
Payer: COMMERCIAL

## 2024-05-16 ENCOUNTER — HOSPITAL ENCOUNTER (OUTPATIENT)
Age: 54
Discharge: HOME | End: 2024-05-16
Payer: COMMERCIAL

## 2024-05-16 VITALS
SYSTOLIC BLOOD PRESSURE: 124 MMHG | DIASTOLIC BLOOD PRESSURE: 77 MMHG | RESPIRATION RATE: 18 BRPM | HEART RATE: 129 BPM | BODY MASS INDEX: 19.26 KG/M2 | WEIGHT: 134.2 LBS

## 2024-05-16 DIAGNOSIS — B20 AIDS (ACQUIRED IMMUNE DEFICIENCY SYNDROME) (HCC): ICD-10-CM

## 2024-05-16 DIAGNOSIS — Z75.8 LANGUAGE BARRIER: ICD-10-CM

## 2024-05-16 DIAGNOSIS — Z60.3 LANGUAGE BARRIER: ICD-10-CM

## 2024-05-16 DIAGNOSIS — B20 AIDS (ACQUIRED IMMUNE DEFICIENCY SYNDROME) (HCC): Primary | ICD-10-CM

## 2024-05-16 LAB
CHOLESTEROL, FASTING: 199 MG/DL
ESTIMATED AVERAGE GLUCOSE: 123 MG/DL
HBA1C MFR BLD: 5.9 % (ref 4.2–6.3)
HBV SURFACE AB SERPL IA-ACNC: <3.5 M[IU]/ML
HCV AB SERPL QL IA: NON REACTIVE
HDLC SERPL-MCNC: 25 MG/DL
LDLC SERPL CALC-MCNC: 149 MG/DL
TRIGLYCERIDE, FASTING: 125 MG/DL

## 2024-05-16 PROCEDURE — 86708 HEPATITIS A ANTIBODY: CPT

## 2024-05-16 PROCEDURE — 86706 HEP B SURFACE ANTIBODY: CPT

## 2024-05-16 PROCEDURE — 80061 LIPID PANEL: CPT

## 2024-05-16 PROCEDURE — 87591 N.GONORRHOEAE DNA AMP PROB: CPT

## 2024-05-16 PROCEDURE — 87116 MYCOBACTERIA CULTURE: CPT

## 2024-05-16 PROCEDURE — 87040 BLOOD CULTURE FOR BACTERIA: CPT

## 2024-05-16 PROCEDURE — 36415 COLL VENOUS BLD VENIPUNCTURE: CPT

## 2024-05-16 PROCEDURE — 86778 TOXOPLASMA ANTIBODY IGM: CPT

## 2024-05-16 PROCEDURE — 87536 HIV-1 QUANT&REVRSE TRNSCRPJ: CPT

## 2024-05-16 PROCEDURE — 99205 OFFICE O/P NEW HI 60 MIN: CPT | Performed by: INTERNAL MEDICINE

## 2024-05-16 PROCEDURE — 87327 CRYPTOCOCCUS NEOFORM AG IA: CPT

## 2024-05-16 PROCEDURE — 87901 NFCT AGT GNTYP ALYS HIV1 REV: CPT

## 2024-05-16 PROCEDURE — 86803 HEPATITIS C AB TEST: CPT

## 2024-05-16 PROCEDURE — 86780 TREPONEMA PALLIDUM: CPT

## 2024-05-16 PROCEDURE — 87491 CHLMYD TRACH DNA AMP PROBE: CPT

## 2024-05-16 PROCEDURE — 86644 CMV ANTIBODY: CPT

## 2024-05-16 PROCEDURE — 83036 HEMOGLOBIN GLYCOSYLATED A1C: CPT

## 2024-05-16 PROCEDURE — 86645 CMV ANTIBODY IGM: CPT

## 2024-05-16 PROCEDURE — 86704 HEP B CORE ANTIBODY TOTAL: CPT

## 2024-05-16 PROCEDURE — 71046 X-RAY EXAM CHEST 2 VIEWS: CPT

## 2024-05-16 RX ORDER — DOXYCYCLINE HYCLATE 100 MG
100 TABLET ORAL 2 TIMES DAILY
Qty: 42 TABLET | Refills: 0 | Status: SHIPPED | OUTPATIENT
Start: 2024-05-16 | End: 2024-06-06

## 2024-05-16 RX ORDER — SULFAMETHOXAZOLE AND TRIMETHOPRIM 800; 160 MG/1; MG/1
1 TABLET ORAL DAILY
Qty: 90 TABLET | Refills: 1 | Status: SHIPPED | OUTPATIENT
Start: 2024-05-16 | End: 2024-11-12

## 2024-05-16 SDOH — SOCIAL STABILITY - SOCIAL INSECURITY: ACCULTURATION DIFFICULTY: Z60.3

## 2024-05-16 NOTE — PROGRESS NOTES
5/18/2024     Diagnosis Orders   1. AIDS (acquired immune deficiency syndrome) (HCC)  Cryptococcal AG Reflex to Titer    Cytomegalovirus Antibody, IgG    Cytomegalovirus Antibody, IgM    Hepatitis A Antibody, Total    Hepatitis B Core Antibody, Total    Hepatitis B Surface Antibody    Hepatitis C Antibody    HIV 1 Genotyping    HLA B 5701 Typing    Toxoplasma Gondii Antibody, IgG    Toxoplasma Gondii Antibody, IgM    XR CHEST (2 VW)    sulfamethoxazole-trimethoprim (BACTRIM DS) 800-160 MG per tablet    Hemoglobin A1C    Lipid, Fasting    Culture, Blood 3    Culture, Blood 2    TREPONEMA PALLIDUM (SYPHILIS) ANTIBODY    Neisseria gonorrhoeae DNA    C.trachomatis N.gonorrhoeae DNA, Urine ([place] Only)    HSV 1, 2 Glyco G-Specific IgG    doxycycline hyclate (VIBRA-TABS) 100 MG tablet    HIV-1 RNA, quantitative, PCR      2. Language barrier            DISCUSSION  HIV: HIV screen positive, CD4 count is 9.  Advanced HIV.  Physical examination he has a painless ulcer in the gluteal cleft.  Suspect LGV: Start 21-day course of doxycycline  Start PJP prophylaxis.  Assess for MAC prior to starting MAC prophylaxis.  Evaluate for occult opportunistic infections.  No components found for: \"CD4H\" No components found for: \"HIVRN\"   Chronic medical problems as above/stable  Lipid recommendation: To be determined, lipid profile ordered.  Labs to be done every 3-6 months: CBC, CD4, VL, BMP, LFTs  Labs to be done once every 12months (last ordered): HCV, Lipids, T-SPOT, RPR, UA  Vaccines: PCV13/PCV15 and PPSV23 OR single dose PCV20, HPV (through 26 years of age), Varicella, Meningococcus (MSM), ShingrixTdap, HAV, HBV, yearly inactivated influenza recommended   Total and free testosterone in men      Future Appointments   Date Time Provider Department Center   5/23/2024  8:15 AM Abhishek Ramirez MD [facility] ID [facility]   7/30/2024 11:00 AM Fer Amaya MD urb rhc pc [facility]   8/13/2024 10:15 AM Guy Jefferson MD [facility] CC [facility]   8/13/2024 10:30 AM

## 2024-05-17 LAB
CRYPTOC AG SER QL IA.RAPID: NORMAL
HAV AB SER QL IA: POSITIVE
HBV CORE AB SERPL QL IA: NEGATIVE

## 2024-05-18 PROBLEM — Z60.3 LANGUAGE BARRIER: Status: ACTIVE | Noted: 2024-05-18

## 2024-05-18 PROBLEM — Z75.8 LANGUAGE BARRIER: Status: ACTIVE | Noted: 2024-05-18

## 2024-05-18 LAB
C TRACH RRNA SPEC QL NAA+PROBE: NEGATIVE
CMV IGG SERPL IA-ACNC: >10 U/ML
CMV IGM SERPL IA-ACNC: <8 AU/ML
N GONORRHOEA RRNA SPEC QL NAA+PROBE: NEGATIVE
T GONDII IGM SER IA-ACNC: <3 AU/ML
T PALLIDUM IGG SER QL IF: NON REACTIVE

## 2024-05-19 LAB
CULTURE: NORMAL
CULTURE: NORMAL
HIV1 RNA # SPEC NAA+PROBE: ABNORMAL CPY/ML
HIV1 RNA SER-IMP: DETECTED
HIV1 RNA SPEC NAA+PROBE-LOG#: 5.09 LOG CPY/ML
Lab: NORMAL
Lab: NORMAL
SPECIMEN: NORMAL
SPECIMEN: NORMAL

## 2024-05-21 LAB
CULTURE: NORMAL
CULTURE: NORMAL
Lab: NORMAL
Lab: NORMAL
SPECIMEN: NORMAL
SPECIMEN: NORMAL

## 2024-05-22 LAB
HIV-1 DRUG RESISTANCE BY NGS: NORMAL
PATHOLOGY STUDY: NORMAL

## 2024-05-22 NOTE — PROGRESS NOTES
iron supplements. Sent to GI      FRANCISCO (acute kidney injury) ([facility]) 11/20/2023     Creatinine is elevated at 1.4.  Increase fluid intake.  CT scan of the abdomen showed kidneys were normal      Abnormal CT scan, stomach 11/20/2023     CT abdomen on 10/5/2023 showed gastric wall thickening involving the distal gastric body which may be related to acute gastritis or possible peptic ulcer disease.  Refer patient to gastroenterologist for further work-up.         Current Outpatient Medications   Medication Sig Dispense Refill    bictegravir-emtricitab-tenofovir alafenamide (BIKTARVY) -25 MG TABS per tablet Take 1 tablet by mouth daily 30 tablet 3    hydrOXYzine HCl (ATARAX) 25 MG tablet Take 1 tablet by mouth every 8 hours as needed for Itching 30 tablet 0    sulfamethoxazole-trimethoprim (BACTRIM DS) 800-160 MG per tablet Take 1 tablet by mouth daily 90 tablet 1    doxycycline hyclate (VIBRA-TABS) 100 MG tablet Take 1 tablet by mouth 2 times daily for 21 days 42 tablet 0    Zinc Oxide 10 % OINT Apply a thin film to affected area daily 56.7 g 0    acetaminophen (TYLENOL) 325 MG tablet Take 2 tablets by mouth every 6 hours as needed for Pain 120 tablet 3    naproxen (NAPROSYN) 500 MG tablet Take 1 tablet by mouth 2 times daily 60 tablet 0    levothyroxine (SYNTHROID) 25 MCG tablet Take 1 tablet by mouth daily 90 tablet 1    permethrin (ELIMITE) 5 % cream Apply topically to the itchiness area  g 1    cetirizine (ZYRTEC) 10 MG tablet Take 1 tablet by mouth daily 30 tablet 0    omeprazole (PRILOSEC) 40 MG delayed release capsule Take 1 capsule by mouth every morning (before breakfast) 90 capsule 1     No current facility-administered medications for this visit.       Patient Active Problem List   Diagnosis    Anemia    Abnormal LFTs    Transaminitis    FRANCISCO (acute kidney injury) ([facility])    Abnormal CT scan, stomach    Monocytosis    Elevated ferritin level    Pruritus    Skin ulcer of perirectal region, limited to

## 2024-05-23 ENCOUNTER — OFFICE VISIT (OUTPATIENT)
Dept: INFECTIOUS DISEASES | Age: 54
End: 2024-05-23
Payer: COMMERCIAL

## 2024-05-23 VITALS
HEART RATE: 105 BPM | DIASTOLIC BLOOD PRESSURE: 73 MMHG | WEIGHT: 127.8 LBS | BODY MASS INDEX: 18.34 KG/M2 | SYSTOLIC BLOOD PRESSURE: 111 MMHG

## 2024-05-23 DIAGNOSIS — Z75.8 LANGUAGE BARRIER: ICD-10-CM

## 2024-05-23 DIAGNOSIS — Z21 ASYMPTOMATIC HIV INFECTION, WITH NO HISTORY OF HIV-RELATED ILLNESS (HCC): Primary | ICD-10-CM

## 2024-05-23 DIAGNOSIS — L29.9 PRURITUS: ICD-10-CM

## 2024-05-23 DIAGNOSIS — Z60.3 LANGUAGE BARRIER: ICD-10-CM

## 2024-05-23 DIAGNOSIS — B20 AIDS (ACQUIRED IMMUNE DEFICIENCY SYNDROME) (HCC): ICD-10-CM

## 2024-05-23 PROCEDURE — 99215 OFFICE O/P EST HI 40 MIN: CPT | Performed by: INTERNAL MEDICINE

## 2024-05-23 RX ORDER — HYDROXYZINE HYDROCHLORIDE 25 MG/1
25 TABLET, FILM COATED ORAL EVERY 8 HOURS PRN
Qty: 30 TABLET | Refills: 0 | Status: SHIPPED | OUTPATIENT
Start: 2024-05-23 | End: 2024-06-02

## 2024-05-23 SDOH — SOCIAL STABILITY - SOCIAL INSECURITY: ACCULTURATION DIFFICULTY: Z60.3

## 2024-05-23 NOTE — PATIENT INSTRUCTIONS
[facility] [address], [address] - P [phone] - F [phone]   [address], [address] [address]   Biktarvy was sent to [facility]    Please have your labs drawn 2 weeks before your next scheduled follow-up appointment. The ordered labs are drawn at the [place] laboratory facilities on Monday through Thursday.

## 2024-05-24 LAB
Lab: NORMAL
TEST NAME: NORMAL

## 2024-06-13 RX ORDER — SODIUM CHLORIDE, SODIUM LACTATE, POTASSIUM CHLORIDE, CALCIUM CHLORIDE 600; 310; 30; 20 MG/100ML; MG/100ML; MG/100ML; MG/100ML
INJECTION, SOLUTION INTRAVENOUS CONTINUOUS
Status: CANCELLED | OUTPATIENT
Start: 2024-06-20

## 2024-06-14 NOTE — PROGRESS NOTES
Spoke with patient through  [facility] ID # [id number] and he will arrive at 1100 at [facility] on 6/20/2024 for his procedure at 1230. I will ask scheduling not to change patient's time.Orders are in epic.    NOTHING TO EAT OR DRINK AFTER MIDNIGHT DAY OF SURGERY    1. Enter thru the hospital main entrance on day of surgery, check in at the Information Desk. If you arrive prior to 6:00am, enter thru the ER entrance.    2. Follow the directions as prescribed by the doctor for your procedure and medications.         Morning of surgery take: prilosec and synthroid.         Stop vitamins, supplements and NSAIDS:      3. Check with your Doctor regarding stopping blood thinners and follow their instructions.    4. Do not smoke, vape or use chewing tobacco morning of surgery. Do not drink any alcoholic beverages 24 hours prior to surgery.       This includes NA Beer. No street drugs 7 days prior to surgery.    5. If you have dentures, contacts of glasses they will be removed before going to the OR; please bring a case.    6. Please bring picture ID, insurance card, paperwork from the doctor’s office (H & P, Consent, & card for implantable devices).    7. Take a shower with an antibacterial soap the night before surgery and the morning of surgery. Do not put anything on your skin      After your morning shower.    8. You will need a responsible adult to drive you home and check on you after surgery.

## 2024-06-14 NOTE — CONSULTS
Session ID: [id number]  Language: Maria Ines Villalpando   ID: #[id number]   Name: Tapanage: Maria Ines Villalpando   ID: #[id number]   Name: Tod

## 2024-06-18 ENCOUNTER — ANESTHESIA EVENT (OUTPATIENT)
Dept: ENDOSCOPY | Age: 54
End: 2024-06-18
Payer: COMMERCIAL

## 2024-06-18 NOTE — ANESTHESIA PRE PROCEDURE
BMI:   Wt Readings from Last 3 Encounters:   05/23/24 58 kg (127 lb 12.8 oz)   05/16/24 60.9 kg (134 lb 3.2 oz)   04/29/24 62.1 kg (137 lb)     Body mass index is 18.22 kg/m².    CBC:   Lab Results   Component Value Date/Time    WBC 7.4 04/27/2024 03:30 AM    RBC 3.55 04/27/2024 03:30 AM    HGB 10.8 04/27/2024 03:30 AM    HCT 33.7 04/27/2024 03:30 AM    MCV 94.9 04/27/2024 03:30 AM    RDW 14.3 04/27/2024 03:30 AM     04/27/2024 03:30 AM       CMP:   Lab Results   Component Value Date/Time     04/27/2024 03:30 AM    K 4.4 04/27/2024 03:30 AM     04/27/2024 03:30 AM    CO2 26 04/27/2024 03:30 AM    BUN 18 04/27/2024 03:30 AM    CREATININE 1.4 04/27/2024 03:30 AM    LABGLOM 60 04/27/2024 03:30 AM    GLUCOSE 97 04/27/2024 03:30 AM    CALCIUM 9.0 04/27/2024 03:30 AM    BILITOT 0.2 04/27/2024 03:30 AM    ALKPHOS 87 04/27/2024 03:30 AM    AST 26 04/27/2024 03:30 AM    ALT 28 04/27/2024 03:30 AM       POC Tests: No results for input(s): \"POCGLU\", \"POCNA\", \"POCK\", \"POCCL\", \"POCBUN\", \"POCHEMO\", \"POCHCT\" in the last 72 hours.    Coags: No results found for: \"PROTIME\", \"INR\", \"APTT\"    HCG (If Applicable): No results found for: \"PREGTESTUR\", \"PREGSERUM\", \"HCG\", \"HCGQUANT\"     ABGs: No results found for: \"PHART\", \"PO2ART\", \"LLL6CNN\", \"PZE2DUI\", \"BEART\", \"H9XEUWPK\"     Type & Screen (If Applicable):  No results found for: \"LABABO\"    Drug/Infectious Status (If Applicable):  Lab Results   Component Value Date/Time    HIV POSITIVE 03/13/2024 09:02 AM    HIV Negative 03/13/2024 09:02 AM    HEPCAB NON REACTIVE 05/16/2024 10:19 AM       COVID-19 Screening (If Applicable):   Lab Results   Component Value Date/Time    COVID19 NOT DETECTED 09/29/2023 07:24 AM           Anesthesia Evaluation  Patient summary reviewed  Airway: Mallampati: II  TM distance: >3 FB   Neck ROM: full  Mouth opening: > = 3 FB   Dental:          Pulmonary:Negative Pulmonary ROS and normal exam        (-) COPD, asthma and

## 2024-06-19 RX ORDER — SODIUM CHLORIDE, SODIUM LACTATE, POTASSIUM CHLORIDE, CALCIUM CHLORIDE 600; 310; 30; 20 MG/100ML; MG/100ML; MG/100ML; MG/100ML
INJECTION, SOLUTION INTRAVENOUS CONTINUOUS
Status: CANCELLED | OUTPATIENT
Start: 2024-06-20

## 2024-06-20 ENCOUNTER — ANESTHESIA (OUTPATIENT)
Dept: ENDOSCOPY | Age: 54
End: 2024-06-20
Payer: COMMERCIAL

## 2024-06-20 ENCOUNTER — HOSPITAL ENCOUNTER (OUTPATIENT)
Age: 54
Setting detail: OUTPATIENT SURGERY
Discharge: HOME | End: 2024-06-20
Attending: INTERNAL MEDICINE | Admitting: INTERNAL MEDICINE
Payer: COMMERCIAL

## 2024-06-20 VITALS
TEMPERATURE: 96.9 F | WEIGHT: 127 LBS | HEIGHT: 70 IN | HEART RATE: 89 BPM | SYSTOLIC BLOOD PRESSURE: 137 MMHG | OXYGEN SATURATION: 100 % | BODY MASS INDEX: 18.18 KG/M2 | RESPIRATION RATE: 16 BRPM | DIASTOLIC BLOOD PRESSURE: 90 MMHG

## 2024-06-20 DIAGNOSIS — Z12.11 COLON CANCER SCREENING: ICD-10-CM

## 2024-06-20 PROCEDURE — 7100000011 HC PHASE II RECOVERY - ADDTL 15 MIN: Performed by: INTERNAL MEDICINE

## 2024-06-20 PROCEDURE — 3700000000 HC ANESTHESIA ATTENDED CARE: Performed by: INTERNAL MEDICINE

## 2024-06-20 PROCEDURE — 2500000003 HC RX 250 WO HCPCS: Performed by: NURSE ANESTHETIST, CERTIFIED REGISTERED

## 2024-06-20 PROCEDURE — 3700000001 HC ADD 15 MINUTES (ANESTHESIA): Performed by: INTERNAL MEDICINE

## 2024-06-20 PROCEDURE — 88305 TISSUE EXAM BY PATHOLOGIST: CPT | Performed by: PATHOLOGY

## 2024-06-20 PROCEDURE — 3609010300 HC COLONOSCOPY W/BIOPSY SINGLE/MULTIPLE: Performed by: INTERNAL MEDICINE

## 2024-06-20 PROCEDURE — 2709999900 HC NON-CHARGEABLE SUPPLY: Performed by: INTERNAL MEDICINE

## 2024-06-20 PROCEDURE — 7100000010 HC PHASE II RECOVERY - FIRST 15 MIN: Performed by: INTERNAL MEDICINE

## 2024-06-20 PROCEDURE — 6360000002 HC RX W HCPCS: Performed by: NURSE ANESTHETIST, CERTIFIED REGISTERED

## 2024-06-20 RX ORDER — PROPOFOL 10 MG/ML
INJECTION, EMULSION INTRAVENOUS PRN
Status: DISCONTINUED | OUTPATIENT
Start: 2024-06-20 | End: 2024-06-20 | Stop reason: SDUPTHER

## 2024-06-20 RX ORDER — LIDOCAINE HYDROCHLORIDE 20 MG/ML
INJECTION, SOLUTION INFILTRATION; PERINEURAL PRN
Status: DISCONTINUED | OUTPATIENT
Start: 2024-06-20 | End: 2024-06-20 | Stop reason: SDUPTHER

## 2024-06-20 RX ADMIN — PROPOFOL 100 MG: 10 INJECTION, EMULSION INTRAVENOUS at 13:21

## 2024-06-20 RX ADMIN — PROPOFOL 125 MCG/KG/MIN: 10 INJECTION, EMULSION INTRAVENOUS at 13:23

## 2024-06-20 RX ADMIN — LIDOCAINE HYDROCHLORIDE 100 MG: 20 INJECTION, SOLUTION INFILTRATION; PERINEURAL at 13:21

## 2024-06-20 ASSESSMENT — PAIN - FUNCTIONAL ASSESSMENT
PAIN_FUNCTIONAL_ASSESSMENT: 0-10
PAIN_FUNCTIONAL_ASSESSMENT: 0-10

## 2024-06-20 NOTE — PROGRESS NOTES
1417 Patient to room from endo a/o vss assessment wnl,beverage of choice given,call light in reach  1413 patients ride called informed he will be ready to be picked up in 15 minutes  1422 VSS ASSESSMENT WNL,IV REMOVED,UP TO THE SIDE OF THE ROOM TO GET DRESSED,DISCHARGE INSTRUCTIONS REVIEWED VOICED UNDERSTANDING  1430 patient to car per staff

## 2024-06-20 NOTE — PROGRESS NOTES
Arrived in \[facility]\"", room 2.  Waiting to give report to \[facility]\"" staff. Patient is back to baseline. Alert and oriented.  Side rails up x 2, call light in reach.  Given apple juice  and reza crackers as requested.

## 2024-06-20 NOTE — ANESTHESIA POSTPROCEDURE EVALUATION
Department of Anesthesiology  Postprocedure Note    Patient: Adrian Horne  MRN: [id number]  YOB: 1970  Date of evaluation: 6/20/2024    Procedure Summary       Date: 06/20/24 Room / Location: [place] [address] / [facility]    Anesthesia Start: 1318 Anesthesia Stop: 1344    Procedure: colonoscopy biopsies: Biopsies of abnormal nodular mucosa in the ascending colon, Diagnosis:       Colon cancer screening      (Colon cancer screening [Z12.11])    Surgeons: Monique Us MD Responsible Provider: Geoff Hugo MD    Anesthesia Type: MAC ASA Status: 3            Anesthesia Type: No value filed.    Kely Phase I: Kely Score: 10    Kely Phase II:      Anesthesia Post Evaluation    Patient location during evaluation: bedside  Patient participation: complete - patient participated  Level of consciousness: awake  Pain score: 0  Airway patency: patent  Nausea & Vomiting: no vomiting and no nausea  Cardiovascular status: blood pressure returned to baseline and hemodynamically stable  Respiratory status: acceptable and room air  Hydration status: euvolemic  Pain management: adequate    No notable events documented.

## 2024-06-20 NOTE — H&P
capsule by mouth every morning (before breakfast) 2/1/24   Monique Us MD      Scheduled Medications:   Infusions:   PRN Medications:     Allergies: No Known Allergies      Allergies:  Patient has no known allergies.    Social History:   TOBACCO:   reports that he has never smoked. He has never used smokeless tobacco.  ETOH:   reports that he does not currently use alcohol.    Family History:   History reviewed. No pertinent family history.    REVIEW OF SYSTEMS:    Negative except for HPI        PHYSICAL EXAM:      Vitals:    BP (!) 143/94   Pulse 87   Temp (!) 96.6 °F (35.9 °C)   Resp 16   Ht 1.778 m (5' 10\")   Wt 57.6 kg (127 lb)   SpO2 100%   BMI 18.22 kg/m²     General Appearance:    Alert, cooperative, no distress, appears stated age   HEENT:    NO anemia, No jaundice , no Cyanosis, Supple neck   Neck:   Supple, symmetrical, trachea midline, no adenopathy;     thyroid:    Lungs:     Clear to auscultation bilaterally, respirations unlabored   Chest Wall:    No tenderness or deformity    Heart:    Regular rate and rhythm, S1 and S2 normal, no murmur, rub   or gallop   Abdomen:     Soft, non-tender, bowel sounds active all four quadrants,     no masses, no organomegaly, no ascitis   Rectal:    Planned at time of C scope   Extremities:   Extremities normal, atraumatic, no cyanosis or edema   Pulses:   2+ and symmetric all extremities   Skin:   Skin color, texture, turgor normal, no rashes or lesions   Lymph nodes:   No abnormality   Neurologic:   No focal deficits, moving all four extremities      ASA Grade:  ASA 3 - Patient with moderate systemic disease with functional limitations  Air Way: As per Pre-procedure Anesthesia note.       ASSESSMENT AND PLAN:    1.  Patient is a 53 y.o. male here for colonoscopy with Anesthesia.   2.  Procedure options, risks and benefits reviewed with patient.  Patient expresses understanding.    Monique Us MD  6/20/2024  12:40 PM

## 2024-06-20 NOTE — PROGRESS NOTES
Received report from MAETO Rodriguez. Patient alert and oriented. Verified patient's name, , allergies and procedure.  No beta blockers, no blood thinners, no implants. H&P on chart.

## 2024-06-20 NOTE — DISCHARGE INSTRUCTIONS
[facility]  [phone]    Do not drive, work around machines or use equipment.  Do not drink any alcoholic beverages.  Do not smoke while alone.  Avoid making important decisions.  Plan to spend a quiet, relaxed evening @ home.  Resume normal activities as you begin to feel better.  Eat lightly for your first meal, then gradually increase your diet to what is normal for you.  In case of nausea, avoid food and drink only clear liquids.  Resume food as nausea ceases.  Notify your surgeon if you experience fever, chills, large amount of bleeding, difficulty breathing, persistent nausea and vomiting or any other disturbing problem.  Call for a follow-up appointment with your surgeon. COLONOSCOPY        OFFICE NUMBER [phone]    FOLLOW UP APPOINTMENT IN 1 WEEKS OR AS NEEDED.     REPEAT PROCEDURE IN 10 YEARS OR AS NEEDED.    TEST ORDERED: NONE OR ______________________________________________________________________.    What to expect at home:  Your Recovery   Your doctor will tell you when you can eat and do your other usual activities Your doctor will talk to you about when you will need your next colonoscopy. Your doctor can help you decide how often you need to be checked. This will depend on the results of your test and your risk for colorectal cancer.  After the test, you may be bloated or have gas pains. You may need to pass gas. If a biopsy was done or a polyp was removed, you may have streaks of blood in your stool (feces) for a few days.  This care sheet gives you a general idea about how long it will take for you to recover. But each person recovers at a different pace. Follow the steps below to get better as quickly as possible.  How can you care for yourself at home?  Activity  Rest when you feel tired.  Diet  Follow your doctor's directions for eating.  Unless your doctor has told you not to, drink plenty of fluids. This helps to replace the fluids that were lost

## 2024-06-21 ENCOUNTER — HOSPITAL ENCOUNTER (OUTPATIENT)
Age: 54
Discharge: HOME | End: 2024-06-21
Payer: COMMERCIAL

## 2024-06-21 DIAGNOSIS — B20 AIDS (ACQUIRED IMMUNE DEFICIENCY SYNDROME) (HCC): ICD-10-CM

## 2024-06-21 LAB
ALBUMIN SERPL-MCNC: 4.1 GM/DL (ref 3.4–5)
ALP BLD-CCNC: 121 IU/L (ref 40–129)
ALT SERPL-CCNC: 25 U/L (ref 10–40)
ANION GAP SERPL CALCULATED.3IONS-SCNC: 10 MMOL/L (ref 7–16)
AST SERPL-CCNC: 27 IU/L (ref 15–37)
BASOPHILS ABSOLUTE: 0 K/CU MM
BASOPHILS RELATIVE PERCENT: 0.5 % (ref 0–1)
BILIRUB SERPL-MCNC: 0.2 MG/DL (ref 0–1)
BILIRUBIN DIRECT: 0.2 MG/DL (ref 0–0.3)
BILIRUBIN, INDIRECT: 0 MG/DL (ref 0–0.7)
BUN SERPL-MCNC: 13 MG/DL (ref 6–23)
CALCIUM SERPL-MCNC: 9.2 MG/DL (ref 8.3–10.6)
CHLORIDE BLD-SCNC: 109 MMOL/L (ref 99–110)
CO2: 21 MMOL/L (ref 21–32)
CREAT SERPL-MCNC: 1.4 MG/DL (ref 0.9–1.3)
DIFFERENTIAL TYPE: ABNORMAL
EOSINOPHILS ABSOLUTE: 1 K/CU MM
EOSINOPHILS RELATIVE PERCENT: 12.4 % (ref 0–3)
GFR, ESTIMATED: 60 ML/MIN/1.73M2
GLUCOSE SERPL-MCNC: 127 MG/DL (ref 70–99)
HCT VFR BLD CALC: 37.6 % (ref 42–52)
HEMOGLOBIN: 12.2 GM/DL (ref 13.5–18)
IMMATURE NEUTROPHIL %: 0.3 % (ref 0–0.43)
LYMPHOCYTES ABSOLUTE: 3.6 K/CU MM
LYMPHOCYTES RELATIVE PERCENT: 46.4 % (ref 24–44)
MCH RBC QN AUTO: 30.7 PG (ref 27–31)
MCHC RBC AUTO-ENTMCNC: 32.4 % (ref 32–36)
MCV RBC AUTO: 94.5 FL (ref 78–100)
MONOCYTES ABSOLUTE: 0.6 K/CU MM
MONOCYTES RELATIVE PERCENT: 7.7 % (ref 0–4)
NEUTROPHILS ABSOLUTE: 2.6 K/CU MM
NEUTROPHILS RELATIVE PERCENT: 32.7 % (ref 36–66)
NUCLEATED RBC %: 0 %
PDW BLD-RTO: 15.3 % (ref 11.7–14.9)
PLATELET # BLD: 352 K/CU MM (ref 140–440)
PMV BLD AUTO: 9.6 FL (ref 7.5–11.1)
POTASSIUM SERPL-SCNC: 3.8 MMOL/L (ref 3.5–5.1)
RBC # BLD: 3.98 M/CU MM (ref 4.6–6.2)
SODIUM BLD-SCNC: 140 MMOL/L (ref 135–145)
TOTAL IMMATURE NEUTOROPHIL: 0.02 K/CU MM
TOTAL NUCLEATED RBC: 0 K/CU MM
TOTAL PROTEIN: 7.1 GM/DL (ref 6.4–8.2)
WBC # BLD: 7.8 K/CU MM (ref 4–10.5)

## 2024-06-21 PROCEDURE — 82955 ASSAY OF G6PD ENZYME: CPT

## 2024-06-21 PROCEDURE — 85025 COMPLETE CBC W/AUTO DIFF WBC: CPT

## 2024-06-21 PROCEDURE — 87103 BLOOD FUNGUS CULTURE: CPT

## 2024-06-21 PROCEDURE — 87536 HIV-1 QUANT&REVRSE TRNSCRPJ: CPT

## 2024-06-21 PROCEDURE — 80053 COMPREHEN METABOLIC PANEL: CPT

## 2024-06-21 PROCEDURE — 82248 BILIRUBIN DIRECT: CPT

## 2024-06-21 PROCEDURE — 36415 COLL VENOUS BLD VENIPUNCTURE: CPT

## 2024-06-24 ENCOUNTER — TELEPHONE (OUTPATIENT)
Dept: GASTROENTEROLOGY | Age: 54
End: 2024-06-24

## 2024-06-24 LAB — G6PD RBC-CCNC: 15.6 U/G HB (ref 9.9–16.6)

## 2024-06-24 NOTE — TELEPHONE ENCOUNTER
Called pt using [employer] line, pt expressed verbal understanding and denied any questions.        ----- Message from ALEE Og CNP sent at 6/24/2024  8:10 AM EDT -----  Please call the patient and inform them that their nodule mucosal area of the ascending colon came back normal tissue.  You will need a repeat colonoscopy in 10 years for screening purposes.

## 2024-06-25 LAB
HIV1 RNA # SPEC NAA+PROBE: ABNORMAL CPY/ML
HIV1 RNA SER-IMP: DETECTED
HIV1 RNA SPEC NAA+PROBE-LOG#: 3.27 LOG CPY/ML

## 2024-07-01 LAB
CULTURE: NORMAL
Lab: NORMAL
SPECIMEN: NORMAL

## 2024-07-08 LAB
CULTURE: NORMAL
Lab: NORMAL
SPECIMEN: NORMAL

## 2024-07-15 LAB
CULTURE: NORMAL
Lab: NORMAL
SPECIMEN: NORMAL

## 2024-07-17 ENCOUNTER — HOSPITAL ENCOUNTER (OUTPATIENT)
Age: 54
Discharge: HOME | End: 2024-07-17
Payer: COMMERCIAL

## 2024-07-17 ENCOUNTER — OFFICE VISIT (OUTPATIENT)
Dept: INFECTIOUS DISEASES | Age: 54
End: 2024-07-17
Payer: COMMERCIAL

## 2024-07-17 VITALS
BODY MASS INDEX: 20.95 KG/M2 | DIASTOLIC BLOOD PRESSURE: 83 MMHG | HEART RATE: 92 BPM | SYSTOLIC BLOOD PRESSURE: 127 MMHG | WEIGHT: 146 LBS

## 2024-07-17 DIAGNOSIS — B20 HIV INFECTION, UNSPECIFIED SYMPTOM STATUS (HCC): ICD-10-CM

## 2024-07-17 DIAGNOSIS — Z60.3 LANGUAGE BARRIER: Primary | ICD-10-CM

## 2024-07-17 DIAGNOSIS — B20 AIDS (ACQUIRED IMMUNE DEFICIENCY SYNDROME) (HCC): ICD-10-CM

## 2024-07-17 DIAGNOSIS — Z75.8 LANGUAGE BARRIER: Primary | ICD-10-CM

## 2024-07-17 LAB
ALBUMIN SERPL-MCNC: 4.2 GM/DL (ref 3.4–5)
ALP BLD-CCNC: 88 IU/L (ref 40–129)
ALT SERPL-CCNC: 13 U/L (ref 10–40)
ANION GAP SERPL CALCULATED.3IONS-SCNC: 12 MMOL/L (ref 7–16)
AST SERPL-CCNC: 23 IU/L (ref 15–37)
BASOPHILS ABSOLUTE: 0.1 K/CU MM
BASOPHILS RELATIVE PERCENT: 1.4 % (ref 0–1)
BILIRUB SERPL-MCNC: 0.2 MG/DL (ref 0–1)
BILIRUBIN DIRECT: 0.2 MG/DL (ref 0–0.3)
BILIRUBIN, INDIRECT: 0 MG/DL (ref 0–0.7)
BUN SERPL-MCNC: 17 MG/DL (ref 6–23)
CALCIUM SERPL-MCNC: 9.4 MG/DL (ref 8.3–10.6)
CHLORIDE BLD-SCNC: 107 MMOL/L (ref 99–110)
CO2: 21 MMOL/L (ref 21–32)
CREAT SERPL-MCNC: 1.3 MG/DL (ref 0.9–1.3)
DIFFERENTIAL TYPE: ABNORMAL
EOSINOPHILS ABSOLUTE: 0.5 K/CU MM
EOSINOPHILS RELATIVE PERCENT: 6.1 % (ref 0–3)
GFR, ESTIMATED: 66 ML/MIN/1.73M2
GLUCOSE SERPL-MCNC: 94 MG/DL (ref 70–99)
HCT VFR BLD CALC: 39.1 % (ref 42–52)
HEMOGLOBIN: 12.6 GM/DL (ref 13.5–18)
IMMATURE NEUTROPHIL %: 0.1 % (ref 0–0.43)
LYMPHOCYTES ABSOLUTE: 5.5 K/CU MM
LYMPHOCYTES RELATIVE PERCENT: 65.1 % (ref 24–44)
MCH RBC QN AUTO: 30.5 PG (ref 27–31)
MCHC RBC AUTO-ENTMCNC: 32.2 % (ref 32–36)
MCV RBC AUTO: 94.7 FL (ref 78–100)
MONOCYTES ABSOLUTE: 0.7 K/CU MM
MONOCYTES RELATIVE PERCENT: 8.4 % (ref 0–4)
NEUTROPHILS ABSOLUTE: 1.6 K/CU MM
NEUTROPHILS RELATIVE PERCENT: 18.9 % (ref 36–66)
NUCLEATED RBC %: 0 %
PDW BLD-RTO: 14.2 % (ref 11.7–14.9)
PLATELET # BLD: 347 K/CU MM (ref 140–440)
PMV BLD AUTO: 9.8 FL (ref 7.5–11.1)
POTASSIUM SERPL-SCNC: 4.2 MMOL/L (ref 3.5–5.1)
RBC # BLD: 4.13 M/CU MM (ref 4.6–6.2)
SODIUM BLD-SCNC: 140 MMOL/L (ref 135–145)
TOTAL IMMATURE NEUTOROPHIL: 0.01 K/CU MM
TOTAL NUCLEATED RBC: 0 K/CU MM
TOTAL PROTEIN: 8 GM/DL (ref 6.4–8.2)
WBC # BLD: 8.5 K/CU MM (ref 4–10.5)

## 2024-07-17 PROCEDURE — 88185 FLOWCYTOMETRY/TC ADD-ON: CPT

## 2024-07-17 PROCEDURE — 82248 BILIRUBIN DIRECT: CPT

## 2024-07-17 PROCEDURE — 85025 COMPLETE CBC W/AUTO DIFF WBC: CPT

## 2024-07-17 PROCEDURE — 87536 HIV-1 QUANT&REVRSE TRNSCRPJ: CPT

## 2024-07-17 PROCEDURE — 88184 FLOWCYTOMETRY/ TC 1 MARKER: CPT

## 2024-07-17 PROCEDURE — 36415 COLL VENOUS BLD VENIPUNCTURE: CPT

## 2024-07-17 PROCEDURE — 99214 OFFICE O/P EST MOD 30 MIN: CPT | Performed by: INTERNAL MEDICINE

## 2024-07-17 PROCEDURE — 80053 COMPREHEN METABOLIC PANEL: CPT

## 2024-07-17 RX ORDER — SULFAMETHOXAZOLE AND TRIMETHOPRIM 800; 160 MG/1; MG/1
1 TABLET ORAL DAILY
Qty: 90 TABLET | Refills: 1 | Status: SHIPPED | OUTPATIENT
Start: 2024-07-17 | End: 2025-01-13

## 2024-07-17 SDOH — SOCIAL STABILITY - SOCIAL INSECURITY: ACCULTURATION DIFFICULTY: Z60.3

## 2024-07-17 NOTE — PATIENT INSTRUCTIONS
Please have your labs drawn 2 weeks before your next scheduled follow-up appointment. The ordered labs are drawn at the [place] laboratory facilities on Monday through Thursday.

## 2024-07-17 NOTE — PROGRESS NOTES
imaging studies were ordered.  CT scan of the abdomen already done.  Liver and gallbladder looks normal and that.      Transaminitis 11/20/2023     Hep ABC negative, immune studies normal. Ferritin high but pt was taking iron supplements. Sent to GI      FRANCISCO (acute kidney injury) ([facility]) 11/20/2023     Creatinine is elevated at 1.4.  Increase fluid intake.  CT scan of the abdomen showed kidneys were normal      Abnormal CT scan, stomach 11/20/2023     CT abdomen on 10/5/2023 showed gastric wall thickening involving the distal gastric body which may be related to acute gastritis or possible peptic ulcer disease.  Refer patient to gastroenterologist for further work-up.         Current Outpatient Medications   Medication Sig Dispense Refill    bictegravir-emtricitab-tenofovir alafenamide (BIKTARVY) -25 MG TABS per tablet Take 1 tablet by mouth daily 30 tablet 3    sulfamethoxazole-trimethoprim (BACTRIM DS) 800-160 MG per tablet Take 1 tablet by mouth daily 90 tablet 1    Zinc Oxide 10 % OINT Apply a thin film to affected area daily 56.7 g 0    acetaminophen (TYLENOL) 325 MG tablet Take 2 tablets by mouth every 6 hours as needed for Pain 120 tablet 3    naproxen (NAPROSYN) 500 MG tablet Take 1 tablet by mouth 2 times daily 60 tablet 0    levothyroxine (SYNTHROID) 25 MCG tablet Take 1 tablet by mouth daily 90 tablet 1    permethrin (ELIMITE) 5 % cream Apply topically to the itchiness area  g 1    omeprazole (PRILOSEC) 40 MG delayed release capsule Take 1 capsule by mouth every morning (before breakfast) 90 capsule 1     No current facility-administered medications for this visit.       Patient Active Problem List   Diagnosis    Anemia    Abnormal LFTs    Transaminitis    FRANCISCO (acute kidney injury) (HCC)    Abnormal CT scan, stomach    Monocytosis    Elevated ferritin level    Pruritus    Skin ulcer of perirectal region, limited to breakdown of skin (HCC)    HIV (human immunodeficiency virus infection) ([facility])

## 2024-07-18 LAB
CD3 CELLS # BLD: 4335 CELLS/UL (ref 570–2400)
CD3 CELLS NFR SPEC: 85 % (ref 62–87)
CD3+CD4+ CELLS # BLD: 143 CELLS/UL (ref 430–1800)
CD3+CD4+ CELLS NFR BLD: 3 % (ref 32–64)
CD3+CD4+ CELLS/CD3+CD8+ CLL BLD: 0.04 RATIO (ref 0.8–3.9)
CD3+CD8+ CELLS # BLD: 4064 CELLS/UL (ref 210–1200)
CD3+CD8+ CELLS NFR SPEC: 80 % (ref 15–46)

## 2024-07-19 LAB
HIV1 RNA # SPEC NAA+PROBE: 221 CPY/ML
HIV1 RNA SER-IMP: DETECTED
HIV1 RNA SPEC NAA+PROBE-LOG#: 2.34 LOG CPY/ML

## 2024-07-20 PROBLEM — Z12.11 COLON CANCER SCREENING: Status: RESOLVED | Noted: 2024-06-20 | Resolved: 2024-07-20

## 2024-07-22 LAB
CULTURE: NORMAL
Lab: NORMAL
SPECIMEN: NORMAL

## 2024-07-30 ENCOUNTER — OFFICE VISIT (OUTPATIENT)
Age: 54
End: 2024-07-30
Payer: COMMERCIAL

## 2024-07-30 VITALS
WEIGHT: 146 LBS | OXYGEN SATURATION: 99 % | DIASTOLIC BLOOD PRESSURE: 88 MMHG | SYSTOLIC BLOOD PRESSURE: 130 MMHG | BODY MASS INDEX: 20.95 KG/M2 | TEMPERATURE: 98.7 F | HEART RATE: 76 BPM | RESPIRATION RATE: 16 BRPM

## 2024-07-30 DIAGNOSIS — R74.01 TRANSAMINITIS: ICD-10-CM

## 2024-07-30 DIAGNOSIS — H10.13 ALLERGIC CONJUNCTIVITIS OF BOTH EYES: ICD-10-CM

## 2024-07-30 DIAGNOSIS — D64.9 ANEMIA, UNSPECIFIED TYPE: ICD-10-CM

## 2024-07-30 DIAGNOSIS — L29.9 PRURITUS: ICD-10-CM

## 2024-07-30 DIAGNOSIS — B20 HIV INFECTION, UNSPECIFIED SYMPTOM STATUS (HCC): Primary | ICD-10-CM

## 2024-07-30 PROCEDURE — 99213 OFFICE O/P EST LOW 20 MIN: CPT | Performed by: INTERNAL MEDICINE

## 2024-07-30 RX ORDER — OLOPATADINE HYDROCHLORIDE 1 MG/ML
1 SOLUTION/ DROPS OPHTHALMIC 2 TIMES DAILY
Qty: 1 EACH | Refills: 0 | Status: SHIPPED | OUTPATIENT
Start: 2024-07-30 | End: 2024-08-29

## 2024-07-30 NOTE — PROGRESS NOTES
Adrian Horne  Patient's  is 1970  Seen in office on 2024      SUBJECTIVE:  Adrian rosado 53 y.o.year old male presents today   Chief Complaint   Patient presents with    3 Month Follow-Up     Patient is here for follow-up.  He had HIV.  He was anemic also  Pt has seen Dr Abhishek Ramirez and is under treatment for HIV  Pt had colonoscopy done and was essentially normal.  EGD earlier showed chronic gastritis : anemia is better  Itching eyes for 2 weeks . No drainage  Patient denies any chest pain.  No shortness of breath.  No cough or sputum production.  No abdominal pain.  No nausea vomiting or diarrhea  LFTs are normal  BMP is normal  CBC also improved anemia        Taking medications regularly. No side effects noted.    Review of Systems    OBJECTIVE: /88   Pulse 76   Temp 98.7 °F (37.1 °C) (Oral)   Resp 16   Wt 66.2 kg (146 lb)   SpO2 99%   BMI 20.95 kg/m²     Wt Readings from Last 3 Encounters:   24 66.2 kg (146 lb)   24 66.2 kg (146 lb)   24 57.6 kg (127 lb)      GENERAL: - Alert, oriented, pleasant, in no apparent distress.    HEENT: - Conjunctiva slightly injected. No drainage , no scleral icterus. ENT clear.  NECK: -Supple.  No jugular venous distention noted. No masses felt,  CARDIOVASCULAR: - Normal S1 and S2    PULMONARY: - No respiratory distress.  No wheezes or rales.    ABDOMEN: - Soft and non-tender,no masses  ororganomegaly.  EXTREMITIES: - No cyanosis, clubbing, or significant edema.  SKIN: Skin is warm and dry.   NEUROLOGICAL: - Cranial nerves II through XII are grossly intact.      IMPRESSION:    Encounter Diagnoses   Name Primary?    HIV infection, unspecified symptom status (HCC) Yes    Pruritus     Transaminitis     Allergic conjunctivitis of both eyes        ASSESSMENT/PLAN:  patanol for the eyes for allergic conjunctivitis    1. HIV infection, unspecified symptom status (HCC)  Overview:  Pt is positive for HIV.  Referred to ID. Pt is under treatment   2.

## 2024-08-13 ENCOUNTER — OFFICE VISIT (OUTPATIENT)
Dept: ONCOLOGY | Age: 54
End: 2024-08-13
Payer: COMMERCIAL

## 2024-08-13 ENCOUNTER — HOSPITAL ENCOUNTER (OUTPATIENT)
Dept: INFUSION THERAPY | Age: 54
Discharge: HOME | End: 2024-08-13
Payer: COMMERCIAL

## 2024-08-13 VITALS
OXYGEN SATURATION: 99 % | HEIGHT: 70 IN | SYSTOLIC BLOOD PRESSURE: 148 MMHG | DIASTOLIC BLOOD PRESSURE: 74 MMHG | TEMPERATURE: 98.3 F | WEIGHT: 146 LBS | HEART RATE: 74 BPM | RESPIRATION RATE: 16 BRPM | BODY MASS INDEX: 20.9 KG/M2

## 2024-08-13 DIAGNOSIS — D64.9 ANEMIA, UNSPECIFIED TYPE: ICD-10-CM

## 2024-08-13 DIAGNOSIS — B20 HIV INFECTION, UNSPECIFIED SYMPTOM STATUS (HCC): Primary | ICD-10-CM

## 2024-08-13 DIAGNOSIS — D72.821 MONOCYTOSIS: ICD-10-CM

## 2024-08-13 PROCEDURE — 99213 OFFICE O/P EST LOW 20 MIN: CPT | Performed by: INTERNAL MEDICINE

## 2024-08-13 PROCEDURE — 99211 OFF/OP EST MAY X REQ PHY/QHP: CPT

## 2024-08-13 RX ORDER — OMEPRAZOLE 40 MG/1
40 CAPSULE, DELAYED RELEASE ORAL
Qty: 90 CAPSULE | Refills: 1 | Status: SHIPPED | OUTPATIENT
Start: 2024-08-13

## 2024-08-13 ASSESSMENT — PATIENT HEALTH QUESTIONNAIRE - PHQ9
SUM OF ALL RESPONSES TO PHQ QUESTIONS 1-9: 0
SUM OF ALL RESPONSES TO PHQ9 QUESTIONS 1 & 2: 0
2. FEELING DOWN, DEPRESSED OR HOPELESS: NOT AT ALL
SUM OF ALL RESPONSES TO PHQ QUESTIONS 1-9: 0
1. LITTLE INTEREST OR PLEASURE IN DOING THINGS: NOT AT ALL
SUM OF ALL RESPONSES TO PHQ QUESTIONS 1-9: 0
SUM OF ALL RESPONSES TO PHQ QUESTIONS 1-9: 0

## 2024-08-13 NOTE — PROGRESS NOTES
MA Rooming Questions  Patient: Adrian Horne  MRN: [id number]    Date: 8/13/2024        1. Do you have any new issues?   no         2. Do you need any refills on medications?    no    3. Have you had any imaging done since your last visit?   yes - Colonoscopy    4. Have you been hospitalized or seen in the emergency room since your last visit here?   yes -     5. Did the patient have a depression screening completed today? Yes    No data recorded     PHQ-9 Given to (if applicable):               PHQ-9 Score (if applicable):                     [] Positive     []  Negative              Does question #9 need addressed (if applicable)                     [] Yes    []  No               Marylou Jiménez CMA      
Normocephalic, without obvious abnormality, atraumatic  NECK: supple, symmetrical, no jugular venous distension, no carotid bruits   HEMATOLOGIC/LYMPHATIC: no cervical, supraclavicular or axillary lymphadenopathy   LUNGS: VBS, no wheezes, no increased work of breathing, no rhonchi, clear to auscultation, no crackles,    CARDIOVASCULAR: regular rate and rhythm, normal S1 and S2, no murmur noted  ABDOMEN: normal bowel sounds x 4, soft, non-distended, non-tender, no masses palpated, no hepatosplenomegaly   MUSCULOSKELETAL: full range of motion noted, tone is normal  NEUROLOGIC: awake, alert, oriented to name, place and time. Motor skills grossly intact.   SKIN: appears intact, normal skin color, normal texture, normal turgor, no jaundice.   EXTREMITIES: no LE edema, no leg swelling, no cyanosis, no clubbing,       Labs:  Hematology:  Lab Results   Component Value Date    WBC 8.5 07/17/2024    RBC 4.13 (L) 07/17/2024    HGB 12.6 (L) 07/17/2024    HCT 39.1 (L) 07/17/2024    MCV 94.7 07/17/2024    MCH 30.5 07/17/2024    MCHC 32.2 07/17/2024    RDW 14.2 07/17/2024     07/17/2024    MPV 9.8 07/17/2024    BASOPCT 1.4 (H) 07/17/2024    LYMPHOPCT 65.1 (H) 07/17/2024    MONOPCT 8.4 (H) 07/17/2024    EOSABS 0.5 07/17/2024    BASOSABS 0.1 07/17/2024    LYMPHSABS 5.5 07/17/2024    MONOSABS 0.7 07/17/2024    DIFFTYPE AUTOMATED DIFFERENTIAL 07/17/2024     No results found for: \"ESR\"    Chemistry:  Lab Results   Component Value Date     07/17/2024    K 4.2 07/17/2024     07/17/2024    CO2 21 07/17/2024    BUN 17 07/17/2024    CREATININE 1.3 07/17/2024    GLUCOSE 94 07/17/2024    CALCIUM 9.4 07/17/2024    BILITOT 0.2 07/17/2024    ALKPHOS 88 07/17/2024    AST 23 07/17/2024    ALT 13 07/17/2024    LABGLOM 66 07/17/2024     Lab Results   Component Value Date     (H) 03/13/2024     No results found for: \"LD\"  Lab Results   Component Value Date    TSHHS 8.150 (H) 03/04/2024     Immunology:  Lab Results

## 2024-08-14 ENCOUNTER — OFFICE VISIT (OUTPATIENT)
Dept: INFECTIOUS DISEASES | Age: 54
End: 2024-08-14
Payer: COMMERCIAL

## 2024-08-14 ENCOUNTER — OFFICE VISIT (OUTPATIENT)
Dept: GASTROENTEROLOGY | Age: 54
End: 2024-08-14
Payer: COMMERCIAL

## 2024-08-14 ENCOUNTER — HOSPITAL ENCOUNTER (OUTPATIENT)
Age: 54
Discharge: HOME | End: 2024-08-14
Payer: COMMERCIAL

## 2024-08-14 VITALS
WEIGHT: 146.8 LBS | HEART RATE: 79 BPM | DIASTOLIC BLOOD PRESSURE: 92 MMHG | BODY MASS INDEX: 21.06 KG/M2 | SYSTOLIC BLOOD PRESSURE: 144 MMHG

## 2024-08-14 VITALS
SYSTOLIC BLOOD PRESSURE: 100 MMHG | HEIGHT: 70 IN | BODY MASS INDEX: 21.02 KG/M2 | DIASTOLIC BLOOD PRESSURE: 60 MMHG | WEIGHT: 146.83 LBS

## 2024-08-14 DIAGNOSIS — D64.9 NORMOCYTIC ANEMIA: ICD-10-CM

## 2024-08-14 DIAGNOSIS — Z12.11 COLON CANCER SCREENING: ICD-10-CM

## 2024-08-14 DIAGNOSIS — M26.622 ARTHRALGIA OF LEFT TEMPOROMANDIBULAR JOINT: ICD-10-CM

## 2024-08-14 DIAGNOSIS — H10.9 CONJUNCTIVITIS OF BOTH EYES, UNSPECIFIED CONJUNCTIVITIS TYPE: ICD-10-CM

## 2024-08-14 DIAGNOSIS — Z21 ASYMPTOMATIC HIV INFECTION, WITH NO HISTORY OF HIV-RELATED ILLNESS (HCC): ICD-10-CM

## 2024-08-14 DIAGNOSIS — Z75.8 LANGUAGE BARRIER: Primary | ICD-10-CM

## 2024-08-14 DIAGNOSIS — Z60.3 LANGUAGE BARRIER: Primary | ICD-10-CM

## 2024-08-14 DIAGNOSIS — K29.00 ACUTE SUPERFICIAL GASTRITIS WITHOUT HEMORRHAGE: Primary | ICD-10-CM

## 2024-08-14 LAB
ANION GAP SERPL CALCULATED.3IONS-SCNC: 12 MMOL/L (ref 7–16)
BUN SERPL-MCNC: 19 MG/DL (ref 6–23)
CALCIUM SERPL-MCNC: 9.7 MG/DL (ref 8.3–10.6)
CHLORIDE BLD-SCNC: 104 MMOL/L (ref 99–110)
CO2: 25 MMOL/L (ref 21–32)
CREAT SERPL-MCNC: 1.2 MG/DL (ref 0.9–1.3)
CRP SERPL HS-MCNC: 3.1 MG/L
GFR, ESTIMATED: 72 ML/MIN/1.73M2
GLUCOSE SERPL-MCNC: 97 MG/DL (ref 70–99)
POTASSIUM SERPL-SCNC: 4 MMOL/L (ref 3.5–5.1)
SED RATE, AUTOMATED: 7 MM/HR (ref 0–20)
SODIUM BLD-SCNC: 141 MMOL/L (ref 135–145)

## 2024-08-14 PROCEDURE — 99215 OFFICE O/P EST HI 40 MIN: CPT | Performed by: INTERNAL MEDICINE

## 2024-08-14 PROCEDURE — 99213 OFFICE O/P EST LOW 20 MIN: CPT | Performed by: INTERNAL MEDICINE

## 2024-08-14 PROCEDURE — 80048 BASIC METABOLIC PNL TOTAL CA: CPT

## 2024-08-14 PROCEDURE — 87536 HIV-1 QUANT&REVRSE TRNSCRPJ: CPT

## 2024-08-14 PROCEDURE — 85652 RBC SED RATE AUTOMATED: CPT

## 2024-08-14 PROCEDURE — 36415 COLL VENOUS BLD VENIPUNCTURE: CPT

## 2024-08-14 PROCEDURE — 86140 C-REACTIVE PROTEIN: CPT

## 2024-08-14 RX ORDER — LORATADINE 10 MG/1
10 TABLET ORAL DAILY
Qty: 30 TABLET | Refills: 0 | Status: SHIPPED | OUTPATIENT
Start: 2024-08-14 | End: 2024-09-13

## 2024-08-14 SDOH — SOCIAL STABILITY - SOCIAL INSECURITY: ACCULTURATION DIFFICULTY: Z60.3

## 2024-08-14 NOTE — PROGRESS NOTES
8/14/2024     Diagnosis Orders   1. Language barrier        2. Asymptomatic HIV infection, with no history of HIV-related illness (HCC)  HIV-1 RNA, quantitative, PCR    Basic Metabolic Panel      3. Arthralgia of left temporomandibular joint  Sedimentation Rate    C-Reactive Protein      4. Conjunctivitis of both eyes, unspecified conjunctivitis type  loratadine (CLARITIN) 10 MG tablet        40 minutes was spent in the encounter; more than 50% of the face-to-face time was spent with the patient providing counseling and coordination of care.      DISCUSSION  HIV; 7/19/2024.  HIV viral load 221/ microliter, CD4 count 143.  He has had a steady decrease in HIV viral load and CD4 count has risen to 143 from 9; it was 123,427 on 5/16/2024, and 1865 on 6/21/2024.  Continues to show decreased but yet to become undetectable.  HIV genotype testing was reviewed.  It had shown potential low-level resistance to elvitegravir and raltegravir.  Repeat HIV viral load in one month   Bilateral eye itching on waking up: likely allergic conjunctivitis. Works with doors.   Left temporal pain. No vision disturbance. Check ESR and CRP  6/21/2024; HIV viral load 1865, this has decreased from viral load of 123,427 measuring 5/16/2024.  Slight increase in serum creatinine to 1.4 to be attributable to bictegravir  Gluteal cleft ulcer has healed. Skin lesions are less pruritic.   continue PJP prophylaxis  CMV IgG positive, hepatitis A total antibody positive, hepatitis B core antibody negative, hepatitis B surface antibody negative, hepatitis C antibody negative.  Hemoglobin A1c 5.9.  .  Cryptococcus antigen negative.  Blood culture negative  No components found for: \"CD4H\" No components found for: \"HIVRN\"   Chronic medical problems as above/stable  Lipid recommendation: To be determined, lipid profile ordered.  Labs to be done every 3-6 months: CBC, CD4, VL, BMP, LFTs  Labs to be done once every 12months (last ordered): HCV, Lipids,

## 2024-08-14 NOTE — PROGRESS NOTES
[facility]             MD Monique Thomas MD Carol Christensen, APRN-CNP       Yolande Driverer, APRN-CNP             [address] [place] [address]             [phone] fax [phone]        Gastroenterology Clinic Consultation    Monique Us MD  Encounter Date: 08/14/24     CC: Follow-up (Pt is here today for a followup visit. )       Patient was referred by Dr. Amaya for abnormal CT scan and anemia.      History obtained from: patient, family, medical records.  [place] A/V          Subjective:      Adrian Horne is an 53 y.o. male who presents for Follow-up (Pt is here today for a followup visit. )  8/14/2024  Since his last visit the patient followed up with me for a colonoscopy his colonoscopy revealed nodular mucosa in the ascending colon otherwise was unremarkable.  Biopsy results were unremarkable.  His hemoglobin has continued to improve.  His LFTs have returned back to normal.  He is following up with infectious diseases for HIV treatment.      4/24/2024  Patient was initially evaluated by me back in December and was scheduled for an upper GI as well as an colonoscopy.  He was noted to have gastric wall thickening and anemia.  He did not do his prep and thus only underwent an upper endoscopy which revealed gastritis, biopsies showed chronic gastritis H. pylori negative.  He was noted to have positive HIV-1 antibody on follow-up with hematology and was referred to ID.      12/5/2023  Patient is here for anemia.  Hemoglobin is noted to be low at 12.5 on 9/5/2023 and it dropped on 9/29/23 to 9.7.  His most recent hemoglobin is 13.0 on 11/27/2023.  AST slightly elevated at 40. he has been followed Patient reports that his stools are a bristol 3.  His iron studies done on iron supplementation on 11/27/2023 noted to be with serum iron of 82, TIBC 281, percent saturation 29 and

## 2024-08-17 LAB
HIV1 RNA # SPEC NAA+PROBE: 121 CPY/ML
HIV1 RNA SER-IMP: DETECTED
HIV1 RNA SPEC NAA+PROBE-LOG#: 2.08 LOG CPY/ML

## 2024-08-23 ENCOUNTER — OFFICE VISIT (OUTPATIENT)
Dept: INFECTIOUS DISEASES | Age: 54
End: 2024-08-23

## 2024-08-23 VITALS
BODY MASS INDEX: 21.01 KG/M2 | HEART RATE: 82 BPM | DIASTOLIC BLOOD PRESSURE: 81 MMHG | WEIGHT: 146.4 LBS | SYSTOLIC BLOOD PRESSURE: 117 MMHG

## 2024-08-23 DIAGNOSIS — Z21 ASYMPTOMATIC HIV INFECTION, WITH NO HISTORY OF HIV-RELATED ILLNESS (HCC): Primary | ICD-10-CM

## 2024-08-23 NOTE — PROGRESS NOTES
8/24/2024     Diagnosis Orders   1. Asymptomatic HIV infection, with no history of HIV-related illness (HCC)  HIV-1 RNA, quantitative, PCR          25 minutes was spent in the encounter; more than 50% of the face-to-face time was spent with the patient providing counseling and coordination of care.      DISCUSSION  HIV; 8/14/2024: HIV viral load 121, sed rate: 7; CRP is not elevated. Left TMJ pain has resolved  7/19/2024.  HIV viral load 221 microliter, CD4 count 143.  He has had a steady decrease in HIV viral load and CD4 count has risen to 143 from 9; it was 123,427 on 5/16/2024, and 1865 on 6/21/2024.  Continues to show decreased but yet to become undetectable.  HIV genotype testing was reviewed.  It had shown potential low-level resistance to elvitegravir and raltegravir.  Repeat HIV viral load in one month   Bilateral eye itching on waking up: likely allergic conjunctivitis. Works with doors.   Left temporal pain. No vision disturbance. Check ESR and CRP  6/21/2024; HIV viral load 1865, this has decreased from viral load of 123,427 measuring 5/16/2024.  Slight increase in serum creatinine to 1.4 to be attributable to bictegravir  Gluteal cleft ulcer has healed. Skin lesions are less pruritic.   continue PJP prophylaxis  CMV IgG positive, hepatitis A total antibody positive, hepatitis B core antibody negative, hepatitis B surface antibody negative, hepatitis C antibody negative.  Hemoglobin A1c 5.9.  .  Cryptococcus antigen negative.  Blood culture negative  No components found for: \"CD4H\" No components found for: \"HIVRN\"   Chronic medical problems as above/stable  Lipid recommendation: To be determined, lipid profile ordered.  Labs to be done every 3-6 months: CBC, CD4, VL, BMP, LFTs  Labs to be done once every 12months (last ordered): HCV, Lipids, T-SPOT, RPR, UA  Vaccines: PCV13/PCV15 and PPSV23 OR single dose PCV20, HPV (through 26 years of age), Varicella, Meningococcus (MSM), ShingrixTdap, HAV, HBV,

## 2024-08-23 NOTE — PATIENT INSTRUCTIONS
Please have your labs drawn 2 weeks before your next scheduled follow-up appointment. The ordered labs are drawn at the [place] laboratory facilities on Monday through Thursday.

## 2024-08-29 ENCOUNTER — HOSPITAL ENCOUNTER (EMERGENCY)
Age: 54
Discharge: HOME | End: 2024-08-29
Payer: COMMERCIAL

## 2024-08-29 VITALS
OXYGEN SATURATION: 99 % | RESPIRATION RATE: 17 BRPM | SYSTOLIC BLOOD PRESSURE: 146 MMHG | TEMPERATURE: 98.4 F | DIASTOLIC BLOOD PRESSURE: 88 MMHG | HEART RATE: 87 BPM

## 2024-08-29 DIAGNOSIS — L70.0 COMEDONE: Primary | ICD-10-CM

## 2024-08-29 PROCEDURE — 99283 EMERGENCY DEPT VISIT LOW MDM: CPT

## 2024-08-29 RX ORDER — CEPHALEXIN 500 MG/1
500 CAPSULE ORAL 2 TIMES DAILY
Qty: 14 CAPSULE | Refills: 0 | Status: SHIPPED | OUTPATIENT
Start: 2024-08-29 | End: 2024-09-05

## 2024-08-29 RX ORDER — MUPIROCIN 20 MG/G
OINTMENT TOPICAL
Qty: 1 G | Refills: 0 | Status: SHIPPED | OUTPATIENT
Start: 2024-08-29 | End: 2024-09-05

## 2024-08-29 RX ORDER — DIPHENHYDRAMINE HCL 25 MG
25 CAPSULE ORAL EVERY 6 HOURS PRN
Qty: 20 CAPSULE | Refills: 0 | Status: SHIPPED | OUTPATIENT
Start: 2024-08-29 | End: 2024-09-08

## 2024-08-29 ASSESSMENT — PAIN SCALES - GENERAL: PAINLEVEL_OUTOF10: 0

## 2024-08-29 ASSESSMENT — PAIN - FUNCTIONAL ASSESSMENT: PAIN_FUNCTIONAL_ASSESSMENT: 0-10

## 2024-08-29 ASSESSMENT — LIFESTYLE VARIABLES
HOW OFTEN DO YOU HAVE A DRINK CONTAINING ALCOHOL: NEVER
HOW MANY STANDARD DRINKS CONTAINING ALCOHOL DO YOU HAVE ON A TYPICAL DAY: PATIENT DOES NOT DRINK

## 2024-08-29 NOTE — ED TRIAGE NOTES
Pt arrived to ED with c/o left sided forehead itching and pain that has been intermittent for past two months. Pt concerns for rash on bilateral hands. A/o and on room air. English speaking.

## 2024-08-29 NOTE — ED PROVIDER NOTES
EMERGENCY DEPARTMENT ENCOUNTER        Pt Name: Adrian Horne  MRN: [id number]  Birthdate 1970  Date of evaluation: 8/29/2024  Provider: TYREE SINGH  PCP: Fer Amaya MD    CRUZ. I have evaluated this patient.        Triage CHIEF COMPLAINT       Chief Complaint   Patient presents with    Facial Swelling     Left sided facial itching and swelling. Intermittent x2 months.     Rash     Bilateral hands          HISTORY OF PRESENT ILLNESS      Chief Complaint: Left-sided facial swelling, left-sided facial pain    Adrian Horne is a 53 y.o. [place] Creole male who presents to the emergency department today with a itchy, burning sensation and swelling to the left forehead area.  He admits that he had some \"intermittent\" itching and swelling of the forehead over the last several days, he states that last night/early this morning around 4 AM he had a stinging sensation into the left upper forehead and now has some swelling and pain associated.  No new rash but is mildly erythematous and swollen.  Patient does have HIV, he is followed very closely by infectious disease, oncology, GI.  Has had recent blood work testing to rule out temporal arteritis, no history of shingles.  Has no eye complaints although he has had recent allergic conjunctivitis states that feeling better with Patanol.    Nursing Notes were all reviewed and agreed with or any disagreements were addressed in the HPI.    REVIEW OF SYSTEMS     At least 10 systems reviewed and otherwise acutely negative except as in the [place].     PAST MEDICAL HISTORY     Past Medical History:   Diagnosis Date    Acid reflux     HIV (human immunodeficiency virus infection) (HCC)     Thyroid disease        SURGICAL HISTORY     Past Surgical History:   Procedure Laterality Date    COLONOSCOPY N/A 6/20/2024    colonoscopy biopsies: Biopsies of abnormal nodular mucosa in the ascending colon, performed by Monique Us MD at [facility]    UPPER  findings:    Interpretation [facility] below, if available at the time of this note:    No orders to display     No results found.      PROCEDURES   Unless otherwise noted below, none     CRITICAL CARE   CRITICAL CARE NOTE:   N/A    CONSULTS:  None      EMERGENCY DEPARTMENT COURSE and MDM:   Vitals:    Vitals:    08/29/24 0645   BP: (!) 146/88   Pulse: 87   Resp: 17   Temp: 98.4 °F (36.9 °C)   TempSrc: Oral   SpO2: 99%       Patient was given thefollowing medications:  Medications - No data to display      Is this patient to be included in the SEP-1 Core Measure due to severe sepsis or septic shock?   No   Exclusion criteria - the patient is NOT to be included for SEP-1 Core Measure due to:  2+ SIRS criteria are not met    MDM:    CC/HPI Summary, DDx, ED Course, and Reassessment:     Patient presents as above.  Emergent etiologies considered.  Patient seen and examined.  Work-up initiated secondary to presentation, physical exam findings, vital signs and medical chart review.    In brief, 53-year-old male presenting to the ED for concern of left sided scalp swelling, erythema.  He said it has been intermittent but worsening this morning.  He felt he may have been stung or bit by something, he does have some swelling into the forehead, is mildly erythematous, there is a comedone in the middle of likely coming from, appears to be infectious in nature.  No significant temporal tenderness.  No significant new eye complaints.  Has had recent evaluation through infectious disease and negative ESR and CRP.  Patient is followed closely, as the physical exam findings are not consistent with shingles.  No open ulcers, vesicles or other significant abnormality.  Will treat with oral Keflex, Bactroban ointment, will advise warm compresses, advised very close follow-up with infectious disease, return for any new or developing symptoms patient otherwise be discharged home in stable condition.    History from :

## 2024-10-07 ENCOUNTER — HOSPITAL ENCOUNTER (OUTPATIENT)
Age: 54
Discharge: HOME | End: 2024-10-07
Payer: COMMERCIAL

## 2024-10-07 DIAGNOSIS — Z21 ASYMPTOMATIC HIV INFECTION, WITH NO HISTORY OF HIV-RELATED ILLNESS (HCC): ICD-10-CM

## 2024-10-07 PROCEDURE — 36415 COLL VENOUS BLD VENIPUNCTURE: CPT

## 2024-10-07 PROCEDURE — 87536 HIV-1 QUANT&REVRSE TRNSCRPJ: CPT

## 2024-10-10 LAB
HIV1 RNA # SERPL NAA+PROBE: 50 CPY/ML
HIV1 RNA SERPL NAA+PROBE-LOG#: 1.7 LOG CPY/ML
HIV1 RNA SERPL QL NAA+PROBE: DETECTED

## 2024-10-11 ENCOUNTER — OFFICE VISIT (OUTPATIENT)
Dept: INFECTIOUS DISEASES | Age: 54
End: 2024-10-11
Payer: COMMERCIAL

## 2024-10-11 VITALS
BODY MASS INDEX: 19.08 KG/M2 | WEIGHT: 133 LBS | DIASTOLIC BLOOD PRESSURE: 81 MMHG | HEART RATE: 88 BPM | SYSTOLIC BLOOD PRESSURE: 119 MMHG

## 2024-10-11 DIAGNOSIS — B20: ICD-10-CM

## 2024-10-11 DIAGNOSIS — L01.02: ICD-10-CM

## 2024-10-11 DIAGNOSIS — B20 AIDS (ACQUIRED IMMUNE DEFICIENCY SYNDROME) (HCC): Primary | ICD-10-CM

## 2024-10-11 PROCEDURE — 99214 OFFICE O/P EST MOD 30 MIN: CPT | Performed by: INTERNAL MEDICINE

## 2024-10-11 RX ORDER — SULFAMETHOXAZOLE/TRIMETHOPRIM 800-160 MG
1 TABLET ORAL DAILY
Qty: 90 TABLET | Refills: 1 | Status: SHIPPED | OUTPATIENT
Start: 2024-10-11 | End: 2025-04-09

## 2024-10-11 RX ORDER — CLOBETASOL PROPIONATE 0.5 MG/G
OINTMENT TOPICAL 2 TIMES DAILY
Qty: 60 G | Refills: 0 | Status: SHIPPED | OUTPATIENT
Start: 2024-10-11

## 2024-10-11 NOTE — PATIENT INSTRUCTIONS
Please have your labs drawn 2 weeks before your next scheduled follow-up appointment. The ordered labs are drawn at the [place] laboratory facilities on Monday through Thursday.

## 2024-10-11 NOTE — PROGRESS NOTES
Narrative    Not on file     Social Determinants of Health     Financial Resource Strain: Not on file   Food Insecurity: Not on file   Transportation Needs: Not on file   Physical Activity: Not on file   Stress: Not on file   Social Connections: Not on file   Intimate Partner Violence: Not on file   Housing Stability: Not on file       No family history on file.    Most recent labs reviewed with patient    CD4 / T Cells:   No components found for: \"CD4H\"  HIV Viral Load:  No components found for: \"HIVRN\"   WBC:  Lab Results   Component Value Date/Time    WBC 8.5 07/17/2024 10:27 AM    WBC 7.8 06/21/2024 01:35 PM    WBC 7.4 04/27/2024 03:30 AM      Creatinine:  Lab Results   Component Value Date/Time    CREATININE 1.2 08/14/2024 10:10 AM    CREATININE 1.3 07/17/2024 10:27 AM    CREATININE 1.4 06/21/2024 01:35 PM     ALT:  Lab Results   Component Value Date/Time    ALT 13 07/17/2024 10:27 AM    ALT 25 06/21/2024 01:35 PM    ALT 28 04/27/2024 03:30 AM     LDL CHOL:  Lab Results   Component Value Date/Time     05/16/2024 10:19 AM       Current problems andcomplaints as above.  Otherwise unremarkable including HEENT, Constitutional, Cardiovascular, Respiratory, GI, Musculoskeletal, Skin, Endocrine, Hemo/Lympatic, Neurologic    Vital Signs:  Vitals:    10/11/24 1434   BP: 119/81   Site: Left Upper Arm   Position: Sitting   Cuff Size: Medium Adult   Pulse: 88   Weight: 60.3 kg (133 lb)           Wt Readings from Last 3 Encounters:   10/11/24 60.3 kg (133 lb)   08/23/24 66.4 kg (146 lb 6.4 oz)   08/14/24 66.6 kg (146 lb 13.2 oz)      Estimated body mass index is 19.08 kg/m² as calculated from the following:    Height as of 8/14/24: 1.778 m (5' 10\").    Weight as of this encounter: 60.3 kg (133 lb).     Physical Exam:   Gen: alert, NAD  HEENT: AT/NC, no icterus, no oral lesions, no dental problems.   Neck: supple, no JVD, no cervical lymphadenopathy, trachea midline, no thyromegaly or thyroid nodules.  Chest: no

## 2024-10-30 ENCOUNTER — HOSPITAL ENCOUNTER (OUTPATIENT)
Age: 54
Discharge: HOME | End: 2024-10-30
Payer: COMMERCIAL

## 2024-10-30 DIAGNOSIS — B20 AIDS (ACQUIRED IMMUNE DEFICIENCY SYNDROME) (HCC): ICD-10-CM

## 2024-10-30 LAB
ALBUMIN SERPL-MCNC: 3.9 G/DL (ref 3.4–5)
ALBUMIN/GLOB SERPL: 1.2 {RATIO} (ref 1.1–2.2)
ALP SERPL-CCNC: 136 U/L (ref 40–129)
ALT SERPL-CCNC: 12 U/L (ref 10–40)
ANION GAP SERPL CALCULATED.3IONS-SCNC: 12 MMOL/L (ref 9–17)
AST SERPL-CCNC: 20 U/L (ref 15–37)
BASOPHILS # BLD: 0.07 K/UL
BASOPHILS NFR BLD: 1 % (ref 0–1)
BILIRUB DIRECT SERPL-MCNC: <0.2 MG/DL (ref 0–0.3)
BILIRUB INDIRECT SERPL-MCNC: ABNORMAL MG/DL (ref 0–0.7)
BILIRUB SERPL-MCNC: <0.2 MG/DL (ref 0–1)
BUN SERPL-MCNC: 14 MG/DL (ref 7–20)
CALCIUM SERPL-MCNC: 9.5 MG/DL (ref 8.3–10.6)
CHLORIDE SERPL-SCNC: 106 MMOL/L (ref 99–110)
CO2 SERPL-SCNC: 23 MMOL/L (ref 21–32)
CREAT SERPL-MCNC: 1.3 MG/DL (ref 0.9–1.3)
EOSINOPHIL # BLD: 0.66 K/UL
EOSINOPHILS RELATIVE PERCENT: 9 % (ref 0–3)
ERYTHROCYTE [DISTWIDTH] IN BLOOD BY AUTOMATED COUNT: 13.2 % (ref 11.7–14.9)
GFR, ESTIMATED: 60 ML/MIN/1.73M2
GLUCOSE SERPL-MCNC: 98 MG/DL (ref 74–99)
HCT VFR BLD AUTO: 41.1 % (ref 42–52)
HGB BLD-MCNC: 13.3 G/DL (ref 13.5–18)
IMM GRANULOCYTES # BLD AUTO: 0.02 K/UL
IMM GRANULOCYTES NFR BLD: 0 %
LYMPHOCYTES NFR BLD: 4.04 K/UL
LYMPHOCYTES RELATIVE PERCENT: 55 % (ref 24–44)
MCH RBC QN AUTO: 29.4 PG (ref 27–31)
MCHC RBC AUTO-ENTMCNC: 32.4 G/DL (ref 32–36)
MCV RBC AUTO: 90.9 FL (ref 78–100)
MONOCYTES NFR BLD: 0.56 K/UL
MONOCYTES NFR BLD: 8 % (ref 0–4)
NEUTROPHILS NFR BLD: 27 % (ref 36–66)
NEUTS SEG NFR BLD: 2.02 K/UL
PLATELET # BLD AUTO: 353 K/UL (ref 140–440)
PMV BLD AUTO: 9.9 FL (ref 7.5–11.1)
POTASSIUM SERPL-SCNC: 4.1 MMOL/L (ref 3.5–5.1)
PROT SERPL-MCNC: 7.1 G/DL (ref 6.4–8.2)
RBC # BLD AUTO: 4.52 M/UL (ref 4.6–6.2)
SODIUM SERPL-SCNC: 141 MMOL/L (ref 136–145)
WBC OTHER # BLD: 7.4 K/UL (ref 4–10.5)

## 2024-10-30 PROCEDURE — 82248 BILIRUBIN DIRECT: CPT

## 2024-10-30 PROCEDURE — 36415 COLL VENOUS BLD VENIPUNCTURE: CPT

## 2024-10-30 PROCEDURE — 85025 COMPLETE CBC W/AUTO DIFF WBC: CPT

## 2024-10-30 PROCEDURE — 86359 T CELLS TOTAL COUNT: CPT

## 2024-10-30 PROCEDURE — 86360 T CELL ABSOLUTE COUNT/RATIO: CPT

## 2024-10-30 PROCEDURE — 80053 COMPREHEN METABOLIC PANEL: CPT

## 2024-11-01 LAB
ANNOTATION COMMENT IMP: ABNORMAL
CD19 CELLS NFR SPEC: 3038 CELLS/UL (ref 210–1200)
CD3 CELLS NFR SPEC: 3341 CELLS/UL (ref 570–2400)
CD3+CD4+ CELLS # BLD: 231 CELLS/UL (ref 430–1800)
CD3+CD4+ CELLS # BLD: 6 % (ref 32–64)
CD3+CD4+ CELLS NFR BLD: 82 % (ref 62–87)
CD3+CD4+ CELLS/CD3+CD8+ CLL BLD: 0.08 RATIO (ref 0.8–3.9)
CD3+CD4+ CELLS/CD3+CD8+ CLL BLD: 75 % (ref 15–46)

## 2024-11-07 ENCOUNTER — OFFICE VISIT (OUTPATIENT)
Age: 54
End: 2024-11-07
Payer: COMMERCIAL

## 2024-11-07 VITALS
DIASTOLIC BLOOD PRESSURE: 70 MMHG | WEIGHT: 141.8 LBS | TEMPERATURE: 97.7 F | OXYGEN SATURATION: 97 % | HEART RATE: 76 BPM | RESPIRATION RATE: 16 BRPM | BODY MASS INDEX: 20.35 KG/M2 | SYSTOLIC BLOOD PRESSURE: 128 MMHG

## 2024-11-07 DIAGNOSIS — E03.9 ACQUIRED HYPOTHYROIDISM: ICD-10-CM

## 2024-11-07 DIAGNOSIS — B20: ICD-10-CM

## 2024-11-07 DIAGNOSIS — B20 AIDS (ACQUIRED IMMUNE DEFICIENCY SYNDROME) (HCC): Primary | ICD-10-CM

## 2024-11-07 DIAGNOSIS — L29.9 PRURITUS: ICD-10-CM

## 2024-11-07 DIAGNOSIS — L01.02: ICD-10-CM

## 2024-11-07 DIAGNOSIS — R79.89 ELEVATED FERRITIN LEVEL: ICD-10-CM

## 2024-11-07 PROBLEM — H10.13 ALLERGIC CONJUNCTIVITIS OF BOTH EYES: Status: RESOLVED | Noted: 2024-07-30 | Resolved: 2024-11-07

## 2024-11-07 PROCEDURE — 99213 OFFICE O/P EST LOW 20 MIN: CPT | Performed by: INTERNAL MEDICINE

## 2024-11-07 RX ORDER — LEVOTHYROXINE SODIUM 25 UG/1
25 TABLET ORAL DAILY
Qty: 90 TABLET | Refills: 1 | Status: SHIPPED | OUTPATIENT
Start: 2024-11-07

## 2024-11-07 NOTE — PROGRESS NOTES
Adrian Horne  Patient's  is 1970  Seen in office on 2024      SUBJECTIVE:  Adrian rosado 53 y.o.year old male presents today   Chief Complaint   Patient presents with    3 Month Follow-Up    Cough     X 1 week productive at times    Skin Problem     Still itching     Pt as HIV and is under treatment by ID  Still has prutitus and is on steroid cream  Has cough and congestion for 1 week  No fever or chills.  Patient has a follow-up appointment with him    GERD on omeprazole.  There is helping him  On synthroid also patient denies any fatigue  Taking medications regularly. No side effects noted.    Review of Systems  Review of system normal except as in HPI  OBJECTIVE: /70   Pulse 76   Temp 97.7 °F (36.5 °C) (Oral)   Resp 16   Wt 64.3 kg (141 lb 12.8 oz)   SpO2 97%   BMI 20.35 kg/m²     Wt Readings from Last 3 Encounters:   24 64.3 kg (141 lb 12.8 oz)   10/11/24 60.3 kg (133 lb)   24 66.4 kg (146 lb 6.4 oz)      GENERAL: - Alert, oriented, pleasant, in no apparent distress.    HEENT: - Conjunctiva pink, no scleral icterus. ENT clear.  NECK: -Supple.  No jugular venous distention noted. No masses felt,  CARDIOVASCULAR: - Normal S1 and S2    PULMONARY: - No respiratory distress.  No wheezes or rales.    ABDOMEN: - Soft and non-tender,no masses  ororganomegaly.  EXTREMITIES: - No cyanosis, clubbing, or significant edema.  SKIN: Skin is warm and dry.   NEUROLOGICAL: - Cranial nerves II through XII are grossly intact.      IMPRESSION:    Encounter Diagnoses   Name Primary?    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 AIDS (acquired immune deficiency syndrome) (HCC)   ID is following it  2. 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  3.

## 2024-11-30 NOTE — CONSULTS
Session ID: [id number]  Language: Congolese Creole   ID: #[id number]   Name: Deandre Pt arrives via EMS from home c/o chest pain and general malaise. Pt hasn't taken his meds x4 days. BGL >600 with EMS. NS infusing via L 18g IV. RR even/unlabored. PHx: DM, HIV. Prefers for daughter to translate. PHx: HIV, stg 3 CKD, BPH, DM, HTN.

## 2024-12-06 ENCOUNTER — HOSPITAL ENCOUNTER (OUTPATIENT)
Age: 54
Discharge: HOME | End: 2024-12-06
Payer: COMMERCIAL

## 2024-12-06 DIAGNOSIS — Z21 ASYMPTOMATIC HIV INFECTION, WITH NO HISTORY OF HIV-RELATED ILLNESS (HCC): ICD-10-CM

## 2024-12-06 PROCEDURE — 87536 HIV-1 QUANT&REVRSE TRNSCRPJ: CPT

## 2024-12-06 PROCEDURE — 36415 COLL VENOUS BLD VENIPUNCTURE: CPT

## 2024-12-09 LAB
HIV1 RNA SERPL NAA+PROBE-LOG#: 2.03 LOG CPY/ML
HIV1 RNA SERPL QL NAA+PROBE: ABNORMAL
HIV1 RNA SERPL QL NAA+PROBE: DETECTED

## 2024-12-10 ENCOUNTER — OFFICE VISIT (OUTPATIENT)
Dept: INFECTIOUS DISEASES | Age: 54
End: 2024-12-10

## 2024-12-10 VITALS
SYSTOLIC BLOOD PRESSURE: 172 MMHG | DIASTOLIC BLOOD PRESSURE: 99 MMHG | BODY MASS INDEX: 20.89 KG/M2 | WEIGHT: 145.6 LBS | HEART RATE: 87 BPM

## 2024-12-10 DIAGNOSIS — Z21 ASYMPTOMATIC HIV INFECTION, WITH NO HISTORY OF HIV-RELATED ILLNESS (HCC): Primary | ICD-10-CM

## 2024-12-10 DIAGNOSIS — L01.02: ICD-10-CM

## 2024-12-10 DIAGNOSIS — B20: ICD-10-CM

## 2024-12-10 DIAGNOSIS — B20 AIDS (ACQUIRED IMMUNE DEFICIENCY SYNDROME) (HCC): ICD-10-CM

## 2024-12-10 RX ORDER — CLOBETASOL PROPIONATE 0.5 MG/G
OINTMENT TOPICAL
Qty: 60 G | Refills: 0 | Status: SHIPPED | OUTPATIENT
Start: 2024-12-10

## 2024-12-10 NOTE — PATIENT INSTRUCTIONS
Please have your labs drawn 2 weeks before your next scheduled follow-up appointment. The ordered labs are drawn at the [place] laboratory facilities on Monday through Thursday.

## 2024-12-10 NOTE — PROGRESS NOTES
12/10/2024     Diagnosis Orders   1. Asymptomatic HIV infection, with no history of HIV-related illness (HCC)  clobetasol (TEMOVATE) 0.05 % ointment    HIV-1, Quantitative, Molecular    CBC with Auto Differential    Basic Metabolic Panel    CD4 Percent and Absolute    Hepatic Function Panel      2. 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 AIDS (acquired immune deficiency syndrome) (HCC)  bictegravir-emtricitab-tenofovir alafenamide (BIKTARVY) -25 MG TABS per tablet              25 minutes was spent in the encounter; more than 50% of the face-to-face time was spent with the patient providing counseling and coordination of care.    Assessment & Plan  1. Human Immunodeficiency Virus (HIV) infection.  His weight has remained stable, with a slight increase of approximately 2 kg since the last visit. The HIV viral load continues to be significantly low, although not yet undetectable. There is no evidence of drug resistance. He is advised to continue using condoms during sexual intercourse until his viral load becomes undetectable. He will maintain his current regimen of Biktarvy. Laboratory tests will be conducted in 3 months to monitor his condition. A prescription refill for his body cream has been provided and sent to [facility]. He is also advised to receive the influenza and COVID-19 vaccines.    2. Skin issues.  He reports that his skin condition has improved but still experiences minor issues. A prescription refill for his body cream has been provided and sent to [facility].    Follow-up  The patient will follow up in 3 months.        DISCUSSION  HIV; Gluteal cleft ulcer has healed. Skin lesions are less pruritic.  Eosinophilic folliculitis He has papular pruritic lesions on his arms, neck and torso.  HIV genotype testing was reviewed.  It had shown potential low-level resistance to elvitegravir and raltegravir.  On 5/16/2024 HIV viral load was 123,427.  Biktarvy was prescribed on

## 2024-12-27 LAB
HIV1 RNA SERPL NAA+PROBE-LOG#: 2.03 LOG CPY/ML
HIV1 RNA SERPL QL NAA+PROBE: 106 CPY/ML
HIV1 RNA SERPL QL NAA+PROBE: DETECTED

## 2025-01-21 ENCOUNTER — TELEPHONE (OUTPATIENT)
Dept: INFECTIOUS DISEASES | Age: 55
End: 2025-01-21

## 2025-01-28 ENCOUNTER — HOSPITAL ENCOUNTER (OUTPATIENT)
Age: 55
Discharge: HOME | End: 2025-01-28
Payer: COMMERCIAL

## 2025-01-28 DIAGNOSIS — E03.9 ACQUIRED HYPOTHYROIDISM: ICD-10-CM

## 2025-01-28 LAB — TSH SERPL DL<=0.05 MIU/L-ACNC: 4.32 UIU/ML (ref 0.27–4.2)

## 2025-01-28 PROCEDURE — 84443 ASSAY THYROID STIM HORMONE: CPT

## 2025-02-06 ENCOUNTER — OFFICE VISIT (OUTPATIENT)
Age: 55
End: 2025-02-06
Payer: COMMERCIAL

## 2025-02-06 VITALS
BODY MASS INDEX: 22.96 KG/M2 | SYSTOLIC BLOOD PRESSURE: 138 MMHG | RESPIRATION RATE: 16 BRPM | WEIGHT: 160 LBS | DIASTOLIC BLOOD PRESSURE: 72 MMHG | OXYGEN SATURATION: 98 % | HEART RATE: 72 BPM | TEMPERATURE: 97.7 F

## 2025-02-06 DIAGNOSIS — E03.9 ACQUIRED HYPOTHYROIDISM: Primary | ICD-10-CM

## 2025-02-06 DIAGNOSIS — K29.30 CHRONIC SUPERFICIAL GASTRITIS WITHOUT BLEEDING: ICD-10-CM

## 2025-02-06 DIAGNOSIS — R79.89 ELEVATED FERRITIN LEVEL: ICD-10-CM

## 2025-02-06 DIAGNOSIS — Z21 ASYMPTOMATIC HIV INFECTION, WITH NO HISTORY OF HIV-RELATED ILLNESS (HCC): ICD-10-CM

## 2025-02-06 PROCEDURE — 99213 OFFICE O/P EST LOW 20 MIN: CPT | Performed by: INTERNAL MEDICINE

## 2025-02-06 RX ORDER — LEVOTHYROXINE SODIUM 50 UG/1
50 TABLET ORAL DAILY
Qty: 90 TABLET | Refills: 1 | Status: SHIPPED | OUTPATIENT
Start: 2025-02-06

## 2025-02-06 RX ORDER — OMEPRAZOLE 40 MG/1
40 CAPSULE, DELAYED RELEASE ORAL
Qty: 90 CAPSULE | Refills: 1 | Status: SHIPPED | OUTPATIENT
Start: 2025-02-06

## 2025-02-06 ASSESSMENT — PATIENT HEALTH QUESTIONNAIRE - PHQ9
1. LITTLE INTEREST OR PLEASURE IN DOING THINGS: NOT AT ALL
SUM OF ALL RESPONSES TO PHQ QUESTIONS 1-9: 0
2. FEELING DOWN, DEPRESSED OR HOPELESS: NOT AT ALL
SUM OF ALL RESPONSES TO PHQ QUESTIONS 1-9: 0
SUM OF ALL RESPONSES TO PHQ9 QUESTIONS 1 & 2: 0
SUM OF ALL RESPONSES TO PHQ QUESTIONS 1-9: 0
SUM OF ALL RESPONSES TO PHQ QUESTIONS 1-9: 0

## 2025-02-06 NOTE — PROGRESS NOTES
level     Chronic superficial gastritis without bleeding        ASSESSMENT/PLAN:    1. Acquired hypothyroidism   TSH is still elevated at 4.3.  Will increase levothyroxine from 25 mcg to 50 mcg daily.  -     levothyroxine (SYNTHROID) 50 MCG tablet; Take 1 tablet by mouth daily, Disp-90 tablet, R-1Normal  2. Asymptomatic HIV infection, with no history of HIV-related illness (HCC)  Patient sees ID and is under treatment.    3. Elevated ferritin level  Overview:  Ferritin is 1434. Transferrin sat : normal  Recheck ferritin 1/18/24  : 1079 :  Hemochromatosis panel is negative.   Referred to Dr Jefferson hem/Onc : he is seeing him  4. Chronic superficial gastritis without bleeding  Overview:  Patient is on omeprazole.  EGD showed gastritis        Medications were reviewed with the patient. Continue current medications.  Appropriate prescriptions were addressed. After visit anna provided.  Follow up as directed : sooner if needed.  Questions were answered and patient verbalized understanding. Call for any problems, questions, or concerns.       No Known Allergies  Current Outpatient Medications   Medication Sig Dispense Refill    clobetasol (TEMOVATE) 0.05 % ointment Apply topically 2 times daily. 60 g 0    [START ON 2/9/2025] bictegravir-emtricitab-tenofovir alafenamide (BIKTARVY) -25 MG TABS per tablet Take 1 tablet by mouth daily 30 tablet 1    levothyroxine (SYNTHROID) 25 MCG tablet Take 1 tablet by mouth daily 90 tablet 1    omeprazole (PRILOSEC) 40 MG delayed release capsule TAKE 1 CAPSULE BY MOUTH EVERY MORNING BEFORE BREAKFAST 90 capsule 1    acetaminophen (TYLENOL) 325 MG tablet Take 2 tablets by mouth every 6 hours as needed for Pain 120 tablet 3     No current facility-administered medications for this visit.     Past Medical History:   Diagnosis Date    Acid reflux     HIV (human immunodeficiency virus infection) (HCC)     Thyroid disease      Past Surgical History:   Procedure Laterality Date    COLONOSCOPY

## 2025-02-12 ENCOUNTER — TELEPHONE (OUTPATIENT)
Dept: ONCOLOGY | Age: 55
End: 2025-02-12

## 2025-02-13 ENCOUNTER — HOSPITAL ENCOUNTER (OUTPATIENT)
Dept: INFUSION THERAPY | Age: 55
End: 2025-02-13

## 2025-02-14 ENCOUNTER — OFFICE VISIT (OUTPATIENT)
Dept: ONCOLOGY | Age: 55
End: 2025-02-14
Payer: COMMERCIAL

## 2025-02-14 ENCOUNTER — HOSPITAL ENCOUNTER (OUTPATIENT)
Dept: INFUSION THERAPY | Age: 55
Discharge: HOME | End: 2025-02-14
Payer: COMMERCIAL

## 2025-02-14 VITALS
RESPIRATION RATE: 16 BRPM | HEART RATE: 76 BPM | TEMPERATURE: 97.9 F | DIASTOLIC BLOOD PRESSURE: 83 MMHG | WEIGHT: 160.2 LBS | BODY MASS INDEX: 22.94 KG/M2 | SYSTOLIC BLOOD PRESSURE: 149 MMHG | HEIGHT: 70 IN | OXYGEN SATURATION: 100 %

## 2025-02-14 DIAGNOSIS — D72.821 MONOCYTOSIS: ICD-10-CM

## 2025-02-14 DIAGNOSIS — D64.9 ANEMIA, UNSPECIFIED TYPE: Primary | ICD-10-CM

## 2025-02-14 PROCEDURE — 99212 OFFICE O/P EST SF 10 MIN: CPT

## 2025-02-14 PROCEDURE — 99213 OFFICE O/P EST LOW 20 MIN: CPT | Performed by: INTERNAL MEDICINE

## 2025-02-14 NOTE — PROGRESS NOTES
MA Rooming Questions  Patient: Adrian Horne  MRN: [id number]    Date: 2/14/2025        1. Do you have any new issues?   no         2. Do you need any refills on medications?    no    3. Have you had any imaging done since your last visit?   no    4. Have you been hospitalized or seen in the emergency room since your last visit here?   no    5. Did the patient have a depression screening completed today? No    No data recorded     PHQ-9 Given to (if applicable):               PHQ-9 Score (if applicable):                     [] Positive     []  Negative              Does question #9 need addressed (if applicable)                     [] Yes    []  No               Leslee Elise MA

## 2025-02-14 NOTE — PROGRESS NOTES
Patient Name:  Adrian Horne  Patient :  1970  Patient MRN:  [id number]     Primary Oncologist: Guy Jefferson MD  Referring Provider: Fer Amaya MD     Date of Service: 2025      Chief Complaint:    Chief Complaint   Patient presents with    Follow-up     Patient Active Problem List:     Anemia     Abnormal LFTs     Transaminitis     FRANCISCO (acute kidney injury) (HCC)     Abnormal CT scan, stomach     Monocytosis     Elevated ferritin level     Pruritus     Skin ulcer of perirectal region, limited to breakdown of skin (HCC)    HPI:   Adrian Horne is a 54 y.o. male with no pertinent medical history referred to me on 3/13/24 for evaluation of anemia, elevated serum ferritin level, relative monocytosis and relative eosinophilia.    He has been having itchiness for several months. He denies bed bug at home or parasitic infection.     He was noted to have elevated serum ferritin, however, hemochromatosis panel was negative.     Also noted to have mild anemia. UGI showed gastric wall thickening and anemia. EGD by Dr. Us showed gastritis and edema. Colonoscopy not done as he did not take prep. Bx : chronic gastritis.     He was also noted to have mild elevation in LFE. All the work ups, including hepatitis panel and autoimmune hepatitis panel were negative.     In view of his anemia, elevated ferritin with relative monocytosis and eosinophilia, he was referred to me for further evaluation.     Laboratory work ups done on 3/13/24 showed positive HIV-1 antibody, relative increased in eosinophil and monocytes. Absolute eosinophil and monocyte counts were normal. The rests of the blood tests, including LDH, ESR, reticulocyte count, haptoglobin, SPEP and immunofixation were also normal.     On 2025, he presented to me for follow up. Reviewed findings on labs done on 3/13/24.     Positive HIV 1 antibody - he was seen by Dr. Kent and he is on Bictegravir since 24. He is feeling better since then.

## 2025-02-20 DIAGNOSIS — B20 AIDS (ACQUIRED IMMUNE DEFICIENCY SYNDROME) (HCC): ICD-10-CM

## 2025-02-24 RX ORDER — BICTEGRAVIR SODIUM, EMTRICITABINE, AND TENOFOVIR ALAFENAMIDE FUMARATE 50; 200; 25 MG/1; MG/1; MG/1
1 TABLET ORAL DAILY
Qty: 30 TABLET | Refills: 1 | OUTPATIENT
Start: 2025-02-24

## 2025-03-04 ENCOUNTER — HOSPITAL ENCOUNTER (OUTPATIENT)
Age: 55
Discharge: HOME | End: 2025-03-04
Payer: COMMERCIAL

## 2025-03-04 DIAGNOSIS — Z21 ASYMPTOMATIC HIV INFECTION, WITH NO HISTORY OF HIV-RELATED ILLNESS (HCC): ICD-10-CM

## 2025-03-04 LAB
ALBUMIN SERPL-MCNC: 4.1 G/DL (ref 3.4–5)
ALBUMIN/GLOB SERPL: 1.2 {RATIO} (ref 1.1–2.2)
ALP SERPL-CCNC: 109 U/L (ref 40–129)
ALT SERPL-CCNC: 18 U/L (ref 10–40)
ANION GAP SERPL CALCULATED.3IONS-SCNC: 10 MMOL/L (ref 9–17)
AST SERPL-CCNC: 32 U/L (ref 15–37)
BASOPHILS # BLD: 0.05 K/UL
BASOPHILS NFR BLD: 1 % (ref 0–1)
BILIRUB DIRECT SERPL-MCNC: <0.2 MG/DL (ref 0–0.3)
BILIRUB INDIRECT SERPL-MCNC: NORMAL MG/DL (ref 0–0.7)
BILIRUB SERPL-MCNC: 0.2 MG/DL (ref 0–1)
BUN SERPL-MCNC: 17 MG/DL (ref 7–20)
CALCIUM SERPL-MCNC: 9.6 MG/DL (ref 8.3–10.6)
CHLORIDE SERPL-SCNC: 105 MMOL/L (ref 99–110)
CO2 SERPL-SCNC: 24 MMOL/L (ref 21–32)
CREAT SERPL-MCNC: 1.3 MG/DL (ref 0.9–1.3)
EOSINOPHIL # BLD: 0.43 K/UL
EOSINOPHILS RELATIVE PERCENT: 5 % (ref 0–3)
ERYTHROCYTE [DISTWIDTH] IN BLOOD BY AUTOMATED COUNT: 12.4 % (ref 11.7–14.9)
GFR, ESTIMATED: 57 ML/MIN/1.73M2
GLUCOSE SERPL-MCNC: 112 MG/DL (ref 74–99)
HCT VFR BLD AUTO: 44.1 % (ref 42–52)
HGB BLD-MCNC: 14.6 G/DL (ref 13.5–18)
IMM GRANULOCYTES # BLD AUTO: 0.03 K/UL
IMM GRANULOCYTES NFR BLD: 0 %
LYMPHOCYTES NFR BLD: 4.46 K/UL
LYMPHOCYTES RELATIVE PERCENT: 49 % (ref 24–44)
MCH RBC QN AUTO: 30.1 PG (ref 27–31)
MCHC RBC AUTO-ENTMCNC: 33.1 G/DL (ref 32–36)
MCV RBC AUTO: 90.9 FL (ref 78–100)
MONOCYTES NFR BLD: 0.74 K/UL
MONOCYTES NFR BLD: 8 % (ref 0–4)
NEUTROPHILS NFR BLD: 38 % (ref 36–66)
NEUTS SEG NFR BLD: 3.47 K/UL
PLATELET # BLD AUTO: 326 K/UL (ref 140–440)
PMV BLD AUTO: 10.6 FL (ref 7.5–11.1)
POTASSIUM SERPL-SCNC: 4 MMOL/L (ref 3.5–5.1)
PROT SERPL-MCNC: 7.6 G/DL (ref 6.4–8.2)
RBC # BLD AUTO: 4.85 M/UL (ref 4.6–6.2)
SODIUM SERPL-SCNC: 139 MMOL/L (ref 136–145)
WBC OTHER # BLD: 9.2 K/UL (ref 4–10.5)

## 2025-03-04 PROCEDURE — 80053 COMPREHEN METABOLIC PANEL: CPT

## 2025-03-04 PROCEDURE — 36415 COLL VENOUS BLD VENIPUNCTURE: CPT

## 2025-03-04 PROCEDURE — 86360 T CELL ABSOLUTE COUNT/RATIO: CPT

## 2025-03-04 PROCEDURE — 86359 T CELLS TOTAL COUNT: CPT

## 2025-03-04 PROCEDURE — 85025 COMPLETE CBC W/AUTO DIFF WBC: CPT

## 2025-03-04 PROCEDURE — 82248 BILIRUBIN DIRECT: CPT

## 2025-03-04 PROCEDURE — 87536 HIV-1 QUANT&REVRSE TRNSCRPJ: CPT

## 2025-03-05 LAB
ANNOTATION COMMENT IMP: ABNORMAL
CD19 CELLS NFR SPEC: 3354 CELLS/UL (ref 210–1200)
CD3 CELLS NFR SPEC: 3708 CELLS/UL (ref 570–2400)
CD3+CD4+ CELLS # BLD: 317 CELLS/UL (ref 430–1800)
CD3+CD4+ CELLS # BLD: 7 % (ref 32–64)
CD3+CD4+ CELLS NFR BLD: 82 % (ref 62–87)
CD3+CD4+ CELLS/CD3+CD8+ CLL BLD: 0.09 RATIO (ref 0.8–3.9)
CD3+CD4+ CELLS/CD3+CD8+ CLL BLD: 74 % (ref 15–46)
HIV1 RNA SERPL NAA+PROBE-LOG#: 1.97 LOG CPY/ML
HIV1 RNA SERPL QL NAA+PROBE: 92 CPY/ML
HIV1 RNA SERPL QL NAA+PROBE: DETECTED

## 2025-03-11 ENCOUNTER — OFFICE VISIT (OUTPATIENT)
Dept: INFECTIOUS DISEASES | Age: 55
End: 2025-03-11
Payer: COMMERCIAL

## 2025-03-11 VITALS
SYSTOLIC BLOOD PRESSURE: 167 MMHG | DIASTOLIC BLOOD PRESSURE: 95 MMHG | HEART RATE: 82 BPM | WEIGHT: 160.6 LBS | BODY MASS INDEX: 23.04 KG/M2

## 2025-03-11 DIAGNOSIS — K21.00 GASTROESOPHAGEAL REFLUX DISEASE WITH ESOPHAGITIS WITHOUT HEMORRHAGE: Primary | ICD-10-CM

## 2025-03-11 DIAGNOSIS — B20 AIDS (ACQUIRED IMMUNE DEFICIENCY SYNDROME) (HCC): ICD-10-CM

## 2025-03-11 PROCEDURE — 99214 OFFICE O/P EST MOD 30 MIN: CPT | Performed by: INTERNAL MEDICINE

## 2025-03-11 NOTE — PROGRESS NOTES
Susceptible            Rilpivirine,RPV             Susceptible              NNRTI drug resistance mutations identified:  None              RT accessory resistance mutations identified:  None              RT additional mutations identified: A98S, K122E, I178M,   T200A, Q207E, R211K, V245E, D250E, A272P, K277R, T286A, I293V,   E297K, S322A, I326V, Q334N, Q334D, M357T, A376N, T386S, T403M,   V435I         Skin ulcer of perirectal region, limited to breakdown of skin (HCC) 03/03/2024     Use bactroban cream      Pruritus 02/27/2024     ? Because of HIV      Monocytosis 12/18/2023     14 %      Elevated ferritin level 12/18/2023     Ferritin is 1434. Transferrin sat : normal  Recheck ferritin 1/18/24  : 1079 :  Hemochromatosis panel is negative.   Referred to Dr Jefferson hem/Onc : he is seeing him      Anemia 11/20/2023     Patient has developed anemia.  CT scan of the abdomen showed gastric wall thickening could be peptic ulcer disease.  EGD showed gastritis and edema.Bx : chronic gastritis. Continue Prilosec for now  Colonoscopy negative 3/2024      Abnormal LFTs 11/20/2023     Patient has abnormal liver function test.  Will investigate further.  Iron studies, hepatitis AB and C and imaging studies were ordered.  CT scan of the abdomen already done.  Liver and gallbladder looks normal and that.      Transaminitis 11/20/2023     Hep ABC negative, immune studies normal. Ferritin high but pt was taking iron supplements. Sent to GI      FRANCISCO (acute kidney injury) 11/20/2023     Creatinine is elevated at 1.4.  Increase fluid intake.  CT scan of the abdomen showed kidneys were normal         Current Outpatient Medications   Medication Sig Dispense Refill    bictegravir-emtricitab-tenofovir alafenamide (BIKTARVY) -25 MG TABS per tablet Take 1 tablet by mouth daily 30 tablet 2    Doravirine (PIFELTRO) 100 MG tablet Take 1 tablet by mouth daily 30 tablet 2    omeprazole (PRILOSEC) 40 MG delayed release capsule Take 1

## 2025-03-11 NOTE — PATIENT INSTRUCTIONS
Please have your labs drawn 2 weeks before your next scheduled follow-up appointment. The ordered labs are drawn at the [place] laboratory facilities on Monday through Thursday.

## 2025-04-10 ENCOUNTER — TELEPHONE (OUTPATIENT)
Dept: INFECTIOUS DISEASES | Age: 55
End: 2025-04-10

## 2025-04-10 NOTE — TELEPHONE ENCOUNTER
Pt and his girlfriend stopped by and is worried about the girlfriend exposure to HIV from the pt. Pt is on Biktarvy. Pt was having intercourse with the girlfriend and states he pulled out and when he looked down the condom was not there. He states he did ejaculate and thinks some was left in her. Pt states he wants to know what they can do to maybe catch the virus before anything starts to happen. Is there any treatment to help with this.    Please advise.

## 2025-04-16 ENCOUNTER — TELEPHONE (OUTPATIENT)
Dept: INFECTIOUS DISEASES | Age: 55
End: 2025-04-16

## 2025-04-17 ENCOUNTER — HOSPITAL ENCOUNTER (OUTPATIENT)
Age: 55
Discharge: HOME | End: 2025-04-17
Payer: COMMERCIAL

## 2025-04-17 DIAGNOSIS — B20 AIDS (ACQUIRED IMMUNE DEFICIENCY SYNDROME) (HCC): ICD-10-CM

## 2025-04-17 LAB
ALBUMIN SERPL-MCNC: 4 G/DL (ref 3.4–5)
ALBUMIN/GLOB SERPL: 1.3 {RATIO} (ref 1.1–2.2)
ALP SERPL-CCNC: 129 U/L (ref 40–129)
ALT SERPL-CCNC: 16 U/L (ref 10–40)
ANION GAP SERPL CALCULATED.3IONS-SCNC: 11 MMOL/L (ref 9–17)
AST SERPL-CCNC: 23 U/L (ref 15–37)
BASOPHILS # BLD: 0.09 K/UL
BASOPHILS NFR BLD: 1 % (ref 0–1)
BILIRUB DIRECT SERPL-MCNC: <0.2 MG/DL (ref 0–0.3)
BILIRUB INDIRECT SERPL-MCNC: NORMAL MG/DL (ref 0–0.7)
BILIRUB SERPL-MCNC: <0.2 MG/DL (ref 0–1)
BUN SERPL-MCNC: 17 MG/DL (ref 7–20)
CALCIUM SERPL-MCNC: 9.5 MG/DL (ref 8.3–10.6)
CHLORIDE SERPL-SCNC: 106 MMOL/L (ref 99–110)
CO2 SERPL-SCNC: 23 MMOL/L (ref 21–32)
CREAT SERPL-MCNC: 1.6 MG/DL (ref 0.9–1.3)
EOSINOPHIL # BLD: 0.94 K/UL
EOSINOPHILS RELATIVE PERCENT: 11 % (ref 0–3)
ERYTHROCYTE [DISTWIDTH] IN BLOOD BY AUTOMATED COUNT: 12.6 % (ref 11.7–14.9)
GFR, ESTIMATED: 45 ML/MIN/1.73M2
GLUCOSE SERPL-MCNC: 99 MG/DL (ref 74–99)
HCT VFR BLD AUTO: 44.1 % (ref 42–52)
HGB BLD-MCNC: 14.6 G/DL (ref 13.5–18)
IMM GRANULOCYTES # BLD AUTO: 0.05 K/UL
IMM GRANULOCYTES NFR BLD: 1 %
LYMPHOCYTES NFR BLD: 3.94 K/UL
LYMPHOCYTES RELATIVE PERCENT: 47 % (ref 24–44)
MCH RBC QN AUTO: 30.8 PG (ref 27–31)
MCHC RBC AUTO-ENTMCNC: 33.1 G/DL (ref 32–36)
MCV RBC AUTO: 93 FL (ref 78–100)
MONOCYTES NFR BLD: 0.64 K/UL
MONOCYTES NFR BLD: 8 % (ref 0–5)
NEUTROPHILS NFR BLD: 32 % (ref 36–66)
NEUTS SEG NFR BLD: 2.68 K/UL
PLATELET # BLD AUTO: 305 K/UL (ref 140–440)
PMV BLD AUTO: 10.3 FL (ref 7.5–11.1)
POTASSIUM SERPL-SCNC: 3.9 MMOL/L (ref 3.5–5.1)
PROT SERPL-MCNC: 7.1 G/DL (ref 6.4–8.2)
RBC # BLD AUTO: 4.74 M/UL (ref 4.6–6.2)
SODIUM SERPL-SCNC: 140 MMOL/L (ref 136–145)
WBC OTHER # BLD: 8.3 K/UL (ref 4–10.5)

## 2025-04-17 PROCEDURE — 87536 HIV-1 QUANT&REVRSE TRNSCRPJ: CPT

## 2025-04-17 PROCEDURE — 80053 COMPREHEN METABOLIC PANEL: CPT

## 2025-04-17 PROCEDURE — 82248 BILIRUBIN DIRECT: CPT

## 2025-04-17 PROCEDURE — 36415 COLL VENOUS BLD VENIPUNCTURE: CPT

## 2025-04-17 PROCEDURE — 85025 COMPLETE CBC W/AUTO DIFF WBC: CPT

## 2025-04-19 LAB
HIV1 RNA SERPL NAA+PROBE-LOG#: 2.21 LOG CPY/ML
HIV1 RNA SERPL QL NAA+PROBE: 164 CPY/ML
HIV1 RNA SERPL QL NAA+PROBE: DETECTED

## 2025-04-22 ENCOUNTER — OFFICE VISIT (OUTPATIENT)
Dept: INFECTIOUS DISEASES | Age: 55
End: 2025-04-22
Payer: COMMERCIAL

## 2025-04-22 VITALS
HEART RATE: 83 BPM | WEIGHT: 163 LBS | BODY MASS INDEX: 23.39 KG/M2 | DIASTOLIC BLOOD PRESSURE: 90 MMHG | SYSTOLIC BLOOD PRESSURE: 137 MMHG

## 2025-04-22 DIAGNOSIS — B20 AIDS (ACQUIRED IMMUNE DEFICIENCY SYNDROME) (HCC): ICD-10-CM

## 2025-04-22 PROCEDURE — 99214 OFFICE O/P EST MOD 30 MIN: CPT | Performed by: INTERNAL MEDICINE

## 2025-04-22 NOTE — PROGRESS NOTES
4/22/2025     Diagnosis Orders   1. AIDS (acquired immune deficiency syndrome) (HCC)  abacavir-dolutegravir-lamiVUDine (TRIUMEQ) 600- MG TABS    Basic Metabolic Panel    CBC with Auto Differential    HIV-1, Quantitative, Molecular    Hepatic Function Panel                      Assessment & Plan  1. Human Immunodeficiency Virus (HIV).  His HIV viral load remains detectable, indicating a need for medication adjustment. The prescription for Triumeq was not filled previously, so it will be removed from his current medication list. He is advised to increase his water intake and consume juice in moderation due to its high sugar content. A transition to Triumeq will be initiated, and he is instructed to discontinue Biktarvy upon starting Triumeq. A follow-up lab test will be conducted in 4 weeks to monitor his viral load and kidney function. He should watch for any abdominal pain or headache while on Triumeq.    2. Elevated serum creatinine.  His serum creatinine level has increased to 1.6, which could be a consequence of his current medication. He is advised to increase his water intake to help manage this issue. Kidney function will be re-evaluated in 4 weeks.    Follow-up  The patient will follow up in 6 weeks.        DISCUSSION  Eosinophilic folliculitis has healed  HIV genotype testing was reviewed.  It had shown potential low-level resistance to elvitegravir and raltegravir.  CMV IgG positive, hepatitis A total antibody positive, hepatitis B core antibody negative, hepatitis B surface antibody negative, hepatitis C antibody negative.  Hemoglobin A1c 5.9.  .  Cryptococcus antigen negative.  Blood culture negative  No components found for: \"CD4H\" No components found for: \"HIVRN\"   Chronic medical problems as above/stable  Lipid recommendation: To be determined, lipid profile ordered.  Labs to be done every 3-6 months: CBC, CD4, VL, BMP, LFTs  Labs to be done once every 12months (last ordered): HCV, Lipids,

## 2025-04-22 NOTE — PATIENT INSTRUCTIONS
Please have your labs drawn 2 weeks before your next scheduled follow-up appointment. The ordered labs are drawn at the [place] laboratory facilities on Monday through Thursday.

## 2025-05-08 ENCOUNTER — OFFICE VISIT (OUTPATIENT)
Age: 55
End: 2025-05-08

## 2025-05-08 VITALS
OXYGEN SATURATION: 98 % | DIASTOLIC BLOOD PRESSURE: 72 MMHG | SYSTOLIC BLOOD PRESSURE: 118 MMHG | HEART RATE: 76 BPM | RESPIRATION RATE: 16 BRPM | WEIGHT: 163.2 LBS | BODY MASS INDEX: 23.42 KG/M2 | TEMPERATURE: 98.1 F

## 2025-05-08 DIAGNOSIS — E03.9 ACQUIRED HYPOTHYROIDISM: Primary | ICD-10-CM

## 2025-05-08 DIAGNOSIS — K21.00 GASTROESOPHAGEAL REFLUX DISEASE WITH ESOPHAGITIS WITHOUT HEMORRHAGE: ICD-10-CM

## 2025-05-08 DIAGNOSIS — L98.491: ICD-10-CM

## 2025-05-08 DIAGNOSIS — Z21 HIV INFECTION, UNSPECIFIED SYMPTOM STATUS (HCC): ICD-10-CM

## 2025-05-08 DIAGNOSIS — B20 AIDS (ACQUIRED IMMUNE DEFICIENCY SYNDROME) (HCC): ICD-10-CM

## 2025-05-08 DIAGNOSIS — N28.9 RENAL INSUFFICIENCY: ICD-10-CM

## 2025-05-08 DIAGNOSIS — K29.30 CHRONIC SUPERFICIAL GASTRITIS WITHOUT BLEEDING: ICD-10-CM

## 2025-05-08 PROBLEM — D72.821 MONOCYTOSIS: Status: RESOLVED | Noted: 2023-12-18 | Resolved: 2025-05-08

## 2025-05-08 PROBLEM — L29.9 PRURITUS: Status: RESOLVED | Noted: 2024-02-27 | Resolved: 2025-05-08

## 2025-05-08 RX ORDER — LEVOTHYROXINE SODIUM 50 UG/1
50 TABLET ORAL DAILY
Qty: 90 TABLET | Refills: 1 | Status: SHIPPED | OUTPATIENT
Start: 2025-05-08

## 2025-05-08 RX ORDER — OMEPRAZOLE 40 MG/1
40 CAPSULE, DELAYED RELEASE ORAL
Qty: 90 CAPSULE | Refills: 1 | Status: SHIPPED | OUTPATIENT
Start: 2025-05-08

## 2025-05-08 NOTE — PROGRESS NOTES
Adrian Horne  Patient's  is 1970  Seen in office on 2025      SUBJECTIVE:  Adrian rosado 54 y.o.year old male presents today   Chief Complaint   Patient presents with    Medication Refill       Patient is here for follow-up of hypothyroidism, GERD.  He also has HIV infection and renal insufficiency with a creatinine of 1.6    Patient has hypothyroidism now he is taking levothyroxine 50 mcg daily.  Will recheck TSH again.  Denies any fatigue    Patient has a history of dyspepsia but symptoms have resolved and he has GERD like symptoms and takes omeprazole and that is helping him.    He has HIV infection and sees ID Dr. Ramirez and he ha has changed his medication to Triumeq as HIV viral load remained detectable    Last creatinine was 1.6  is repeating the BMP in 4 to 6 weeks.  Pruritus resolved  Not seeing Dr Jefferson any more  Ulcer in bottom resolved and healed      Taking medications regularly. No side effects noted.    Review of Systems    OBJECTIVE: /72   Pulse 76   Temp 98.1 °F (36.7 °C) (Oral)   Resp 16   Wt 74 kg (163 lb 3.2 oz)   SpO2 98%   BMI 23.42 kg/m²     Wt Readings from Last 3 Encounters:   25 74 kg (163 lb 3.2 oz)   25 73.9 kg (163 lb)   25 72.8 kg (160 lb 9.6 oz)      GENERAL: - Alert, oriented, pleasant, in no apparent distress.    HEENT: - Conjunctiva pink, no scleral icterus. ENT clear.  NECK: -Supple.  No jugular venous distention noted. No masses felt,  CARDIOVASCULAR: - Normal S1 and S2    PULMONARY: - No respiratory distress.  No wheezes or rales.    ABDOMEN: - Soft and non-tender,no masses  ororganomegaly.  EXTREMITIES: - No cyanosis, clubbing, or significant edema.  SKIN: Skin is warm and dry.   NEUROLOGICAL: - Cranial nerves II through XII are grossly intact.      IMPRESSION:    Encounter Diagnoses   Name Primary?    Acquired hypothyroidism Yes    Chronic superficial gastritis without bleeding     HIV infection, unspecified symptom status (HCC)

## 2025-05-27 ENCOUNTER — HOSPITAL ENCOUNTER (OUTPATIENT)
Age: 55
Discharge: HOME | End: 2025-05-27
Payer: COMMERCIAL

## 2025-05-27 DIAGNOSIS — B20 AIDS (ACQUIRED IMMUNE DEFICIENCY SYNDROME) (HCC): ICD-10-CM

## 2025-05-27 LAB
ALBUMIN SERPL-MCNC: 4 G/DL (ref 3.4–5)
ALBUMIN/GLOB SERPL: 1.3 {RATIO} (ref 1.1–2.2)
ALP SERPL-CCNC: 111 U/L (ref 40–129)
ALT SERPL-CCNC: 20 U/L (ref 10–40)
ANION GAP SERPL CALCULATED.3IONS-SCNC: 12 MMOL/L (ref 9–17)
AST SERPL-CCNC: 22 U/L (ref 15–37)
BASOPHILS # BLD: 0 K/UL
BASOPHILS NFR BLD: 0 % (ref 0–1)
BILIRUB DIRECT SERPL-MCNC: <0.2 MG/DL (ref 0–0.3)
BILIRUB INDIRECT SERPL-MCNC: NORMAL MG/DL (ref 0–0.7)
BILIRUB SERPL-MCNC: 0.3 MG/DL (ref 0–1)
BUN SERPL-MCNC: 20 MG/DL (ref 7–20)
CALCIUM SERPL-MCNC: 9.7 MG/DL (ref 8.3–10.6)
CHLORIDE SERPL-SCNC: 105 MMOL/L (ref 99–110)
CO2 SERPL-SCNC: 22 MMOL/L (ref 21–32)
CREAT SERPL-MCNC: 1.5 MG/DL (ref 0.9–1.3)
EOSINOPHIL # BLD: 0.4 K/UL
EOSINOPHILS RELATIVE PERCENT: 4 % (ref 0–3)
ERYTHROCYTE [DISTWIDTH] IN BLOOD BY AUTOMATED COUNT: 13.4 % (ref 11.7–14.9)
GFR, ESTIMATED: 50 ML/MIN/1.73M2
GLUCOSE SERPL-MCNC: 101 MG/DL (ref 74–99)
HCT VFR BLD AUTO: 44.1 % (ref 42–52)
HGB BLD-MCNC: 14.4 G/DL (ref 13.5–18)
LYMPHOCYTES NFR BLD: 6.36 K/UL
LYMPHOCYTES RELATIVE PERCENT: 63 % (ref 24–44)
MCH RBC QN AUTO: 30.4 PG (ref 27–31)
MCHC RBC AUTO-ENTMCNC: 32.7 G/DL (ref 32–36)
MCV RBC AUTO: 93 FL (ref 78–100)
MONOCYTES NFR BLD: 0.71 K/UL
MONOCYTES NFR BLD: 7 % (ref 0–5)
NEUTROPHILS NFR BLD: 26 % (ref 36–66)
NEUTS SEG NFR BLD: 2.63 K/UL
PLATELET # BLD AUTO: 281 K/UL (ref 140–440)
PLATELET ESTIMATE: ABNORMAL
PMV BLD AUTO: 10.7 FL (ref 7.5–11.1)
POTASSIUM SERPL-SCNC: 3.8 MMOL/L (ref 3.5–5.1)
PROT SERPL-MCNC: 7.1 G/DL (ref 6.4–8.2)
RBC # BLD AUTO: 4.74 M/UL (ref 4.6–6.2)
RBC # BLD: ABNORMAL 10*6/UL
RBC # BLD: ABNORMAL 10*6/UL
SODIUM SERPL-SCNC: 139 MMOL/L (ref 136–145)
WBC # BLD: ABNORMAL 10*3/UL
WBC OTHER # BLD: 10.1 K/UL (ref 4–10.5)

## 2025-05-27 PROCEDURE — 82248 BILIRUBIN DIRECT: CPT

## 2025-05-27 PROCEDURE — 36415 COLL VENOUS BLD VENIPUNCTURE: CPT

## 2025-05-27 PROCEDURE — 87536 HIV-1 QUANT&REVRSE TRNSCRPJ: CPT

## 2025-05-27 PROCEDURE — 80053 COMPREHEN METABOLIC PANEL: CPT

## 2025-05-27 PROCEDURE — 85025 COMPLETE CBC W/AUTO DIFF WBC: CPT

## 2025-05-30 LAB
HIV1 RNA SERPL NAA+PROBE-LOG#: 1.85 LOG CPY/ML
HIV1 RNA SERPL QL NAA+PROBE: 71 CPY/ML
HIV1 RNA SERPL QL NAA+PROBE: DETECTED

## 2025-06-03 ENCOUNTER — OFFICE VISIT (OUTPATIENT)
Dept: INFECTIOUS DISEASES | Age: 55
End: 2025-06-03
Payer: COMMERCIAL

## 2025-06-03 VITALS
SYSTOLIC BLOOD PRESSURE: 137 MMHG | HEART RATE: 75 BPM | BODY MASS INDEX: 23.16 KG/M2 | WEIGHT: 161.4 LBS | DIASTOLIC BLOOD PRESSURE: 84 MMHG

## 2025-06-03 DIAGNOSIS — Z21 ASYMPTOMATIC HIV INFECTION, WITH NO HISTORY OF HIV-RELATED ILLNESS (HCC): Primary | ICD-10-CM

## 2025-06-03 DIAGNOSIS — B20 AIDS (ACQUIRED IMMUNE DEFICIENCY SYNDROME) (HCC): ICD-10-CM

## 2025-06-03 PROCEDURE — 99214 OFFICE O/P EST MOD 30 MIN: CPT | Performed by: INTERNAL MEDICINE

## 2025-06-03 NOTE — PROGRESS NOTES
6/3/2025     Diagnosis Orders   1. Asymptomatic HIV infection, with no history of HIV-related illness (HCC)  CD4 Percent and Absolute    CBC with Auto Differential    Basic Metabolic Panel    Hepatic Function Panel    HIV-1, Quantitative, Molecular      2. AIDS (acquired immune deficiency syndrome) (HCC)  abacavir-dolutegravir-lamiVUDine (TRIUMEQ) 600- MG TABS                        Assessment & Plan    1. Human Immunodeficiency Virus (HIV).  His viral load, while not undetectable, has shown improvement with the current medication regimen. The medication is effectively suppressing the virus, although it has not achieved complete suppression. His weight remains stable, and his skin condition appears to be improving. He will continue with the Triumeq regimen. A prescription for a 60-day supply of Triumeq has been sent to [facility]. He is advised to complete his laboratory tests two weeks prior to his next appointment.    2. Elevated serum creatinine.  His serum creatinine level has decreased to 1.5 from 1.6  which could be a consequence of his current medication. He is advised to increase his water intake to help manage this issue. Kidney function will be re-evaluated    Follow-up  The patient will follow up in 3 months.        DISCUSSION  Eosinophilic folliculitis has healed  HIV genotype testing was reviewed.  It had shown potential low-level resistance to elvitegravir and raltegravir.  CMV IgG positive, hepatitis A total antibody positive, hepatitis B core antibody negative, hepatitis B surface antibody negative, hepatitis C antibody negative.  Hemoglobin A1c 5.9.  .  Cryptococcus antigen negative.  Blood culture negative  No components found for: \"CD4H\" No components found for: \"HIVRN\"   Chronic medical problems as above/stable  Lipid recommendation: To be determined, lipid profile ordered.  Labs to be done every 3-6 months: CBC, CD4, VL, BMP, LFTs  Labs to be done once every 12months (last ordered):

## 2025-06-03 NOTE — CONSULTS
Session ID: [id number]  Session Duration: 22 minutes  Language: Bhutanese Creole   ID: #[id number]   Name: Vaishali

## 2025-06-03 NOTE — PATIENT INSTRUCTIONS
Please have your labs drawn 2 weeks before your next scheduled follow-up appointment. The ordered labs are drawn at the [place] laboratory facilities on Monday through Thursday.

## 2025-07-30 ENCOUNTER — HOSPITAL ENCOUNTER (OUTPATIENT)
Dept: LAB | Age: 55
Discharge: HOME | End: 2025-07-30
Payer: COMMERCIAL

## 2025-07-30 DIAGNOSIS — E03.9 ACQUIRED HYPOTHYROIDISM: ICD-10-CM

## 2025-07-30 LAB — TSH SERPL DL<=0.05 MIU/L-ACNC: 2.56 UIU/ML (ref 0.27–4.2)

## 2025-07-30 PROCEDURE — 84443 ASSAY THYROID STIM HORMONE: CPT

## 2025-08-11 ENCOUNTER — OFFICE VISIT (OUTPATIENT)
Age: 55
End: 2025-08-11

## 2025-08-11 VITALS
RESPIRATION RATE: 16 BRPM | SYSTOLIC BLOOD PRESSURE: 138 MMHG | BODY MASS INDEX: 23.76 KG/M2 | TEMPERATURE: 98.2 F | WEIGHT: 165.6 LBS | OXYGEN SATURATION: 98 % | HEART RATE: 76 BPM | DIASTOLIC BLOOD PRESSURE: 84 MMHG

## 2025-08-11 DIAGNOSIS — E03.9 ACQUIRED HYPOTHYROIDISM: Primary | ICD-10-CM

## 2025-08-11 DIAGNOSIS — B37.9 YEAST INFECTION: ICD-10-CM

## 2025-08-11 DIAGNOSIS — R79.89 ELEVATED FERRITIN LEVEL: ICD-10-CM

## 2025-08-11 DIAGNOSIS — Z21 ASYMPTOMATIC HIV INFECTION, WITH NO HISTORY OF HIV-RELATED ILLNESS (HCC): ICD-10-CM

## 2025-08-11 DIAGNOSIS — K21.00 GASTROESOPHAGEAL REFLUX DISEASE WITH ESOPHAGITIS WITHOUT HEMORRHAGE: ICD-10-CM

## 2025-08-11 PROBLEM — K29.30 CHRONIC SUPERFICIAL GASTRITIS WITHOUT BLEEDING: Status: RESOLVED | Noted: 2025-02-06 | Resolved: 2025-08-11

## 2025-08-11 PROBLEM — N17.9 AKI (ACUTE KIDNEY INJURY): Status: RESOLVED | Noted: 2023-11-20 | Resolved: 2025-08-11

## 2025-08-11 RX ORDER — CLOBETASOL PROPIONATE 0.5 MG/G
OINTMENT TOPICAL
Qty: 60 G | Refills: 0 | Status: CANCELLED | OUTPATIENT
Start: 2025-08-11

## 2025-08-11 RX ORDER — CLOTRIMAZOLE AND BETAMETHASONE DIPROPIONATE 10; .64 MG/G; MG/G
CREAM TOPICAL
Qty: 45 G | Refills: 0 | Status: SHIPPED | OUTPATIENT
Start: 2025-08-11

## 2025-08-11 SDOH — ECONOMIC STABILITY: FOOD INSECURITY: WITHIN THE PAST 12 MONTHS, THE FOOD YOU BOUGHT JUST DIDN'T LAST AND YOU DIDN'T HAVE MONEY TO GET MORE.: NEVER TRUE

## 2025-08-11 SDOH — ECONOMIC STABILITY: FOOD INSECURITY: WITHIN THE PAST 12 MONTHS, YOU WORRIED THAT YOUR FOOD WOULD RUN OUT BEFORE YOU GOT MONEY TO BUY MORE.: NEVER TRUE

## 2025-08-25 ENCOUNTER — HOSPITAL ENCOUNTER (OUTPATIENT)
Dept: LAB | Age: 55
Discharge: HOME | End: 2025-08-25
Payer: COMMERCIAL

## 2025-08-25 DIAGNOSIS — Z21 ASYMPTOMATIC HIV INFECTION, WITH NO HISTORY OF HIV-RELATED ILLNESS (HCC): ICD-10-CM

## 2025-08-25 LAB
ALBUMIN SERPL-MCNC: 4.1 G/DL (ref 3.4–5)
ALBUMIN/GLOB SERPL: 1.3 {RATIO} (ref 1.1–2.2)
ALP SERPL-CCNC: 115 U/L (ref 40–129)
ALT SERPL-CCNC: 15 U/L (ref 10–40)
ANION GAP SERPL CALCULATED.3IONS-SCNC: 13 MMOL/L (ref 9–17)
AST SERPL-CCNC: 23 U/L (ref 15–37)
BASOPHILS # BLD: 0.08 K/UL
BASOPHILS NFR BLD: 1 % (ref 0–1)
BILIRUB DIRECT SERPL-MCNC: <0.2 MG/DL (ref 0–0.3)
BILIRUB INDIRECT SERPL-MCNC: NORMAL MG/DL (ref 0–0.7)
BILIRUB SERPL-MCNC: 0.3 MG/DL (ref 0–1)
BUN SERPL-MCNC: 20 MG/DL (ref 7–20)
CALCIUM SERPL-MCNC: 9.2 MG/DL (ref 8.3–10.6)
CHLORIDE SERPL-SCNC: 104 MMOL/L (ref 99–110)
CO2 SERPL-SCNC: 22 MMOL/L (ref 21–32)
CREAT SERPL-MCNC: 1.6 MG/DL (ref 0.9–1.3)
EOSINOPHIL # BLD: 0.1 K/UL
EOSINOPHILS RELATIVE PERCENT: 1 % (ref 0–3)
ERYTHROCYTE [DISTWIDTH] IN BLOOD BY AUTOMATED COUNT: 13.1 % (ref 11.7–14.9)
GFR, ESTIMATED: 46 ML/MIN/1.73M2
GLUCOSE SERPL-MCNC: 119 MG/DL (ref 74–99)
HCT VFR BLD AUTO: 43.4 % (ref 42–52)
HGB BLD-MCNC: 14.4 G/DL (ref 13.5–18)
IMM GRANULOCYTES # BLD AUTO: 0.04 K/UL
IMM GRANULOCYTES NFR BLD: 0 %
LYMPHOCYTES NFR BLD: 4.39 K/UL
LYMPHOCYTES RELATIVE PERCENT: 49 % (ref 24–44)
MCH RBC QN AUTO: 31.3 PG (ref 27–31)
MCHC RBC AUTO-ENTMCNC: 33.2 G/DL (ref 32–36)
MCV RBC AUTO: 94.3 FL (ref 78–100)
MONOCYTES NFR BLD: 0.74 K/UL
MONOCYTES NFR BLD: 8 % (ref 0–5)
NEUTROPHILS NFR BLD: 40 % (ref 36–66)
NEUTS SEG NFR BLD: 3.57 K/UL
PLATELET # BLD AUTO: 271 K/UL (ref 140–440)
PMV BLD AUTO: 10.1 FL (ref 7.5–11.1)
POTASSIUM SERPL-SCNC: 3.9 MMOL/L (ref 3.5–5.1)
PROT SERPL-MCNC: 7.2 G/DL (ref 6.4–8.2)
RBC # BLD AUTO: 4.6 M/UL (ref 4.6–6.2)
SODIUM SERPL-SCNC: 138 MMOL/L (ref 136–145)
WBC OTHER # BLD: 8.9 K/UL (ref 4–10.5)

## 2025-08-25 PROCEDURE — 87536 HIV-1 QUANT&REVRSE TRNSCRPJ: CPT

## 2025-08-25 PROCEDURE — 36415 COLL VENOUS BLD VENIPUNCTURE: CPT

## 2025-08-25 PROCEDURE — 86360 T CELL ABSOLUTE COUNT/RATIO: CPT

## 2025-08-25 PROCEDURE — 85025 COMPLETE CBC W/AUTO DIFF WBC: CPT

## 2025-08-25 PROCEDURE — 82248 BILIRUBIN DIRECT: CPT

## 2025-08-25 PROCEDURE — 86359 T CELLS TOTAL COUNT: CPT

## 2025-08-25 PROCEDURE — 80053 COMPREHEN METABOLIC PANEL: CPT

## 2025-08-27 LAB
ANNOTATION COMMENT IMP: ABNORMAL
CD19 CELLS NFR SPEC: 2827 CELLS/UL (ref 210–1200)
CD3 CELLS NFR SPEC: 3321 CELLS/UL (ref 570–2400)
CD3+CD4+ CELLS # BLD: 437 CELLS/UL (ref 430–1800)
CD3+CD4+ CELLS # BLD: 9 % (ref 32–64)
CD3+CD4+ CELLS NFR BLD: 68 % (ref 62–87)
CD3+CD4+ CELLS/CD3+CD8+ CLL BLD: 0.16 RATIO (ref 0.8–3.9)
CD3+CD4+ CELLS/CD3+CD8+ CLL BLD: 58 % (ref 15–46)

## 2025-08-29 DIAGNOSIS — B20 AIDS (ACQUIRED IMMUNE DEFICIENCY SYNDROME) (HCC): ICD-10-CM

## 2025-08-29 LAB
HIV1 RNA SERPL NAA+PROBE-LOG#: 1.97 LOG CPY/ML
HIV1 RNA SERPL QL NAA+PROBE: 94 CPY/ML
HIV1 RNA SERPL QL NAA+PROBE: DETECTED

## 2025-09-02 ENCOUNTER — OFFICE VISIT (OUTPATIENT)
Dept: INFECTIOUS DISEASES | Age: 55
End: 2025-09-02
Payer: COMMERCIAL

## 2025-09-02 VITALS
DIASTOLIC BLOOD PRESSURE: 94 MMHG | SYSTOLIC BLOOD PRESSURE: 147 MMHG | BODY MASS INDEX: 23.53 KG/M2 | WEIGHT: 164 LBS | HEART RATE: 82 BPM

## 2025-09-02 DIAGNOSIS — B20 AIDS (ACQUIRED IMMUNE DEFICIENCY SYNDROME) (HCC): ICD-10-CM

## 2025-09-02 DIAGNOSIS — N17.9 AKI (ACUTE KIDNEY INJURY): Primary | ICD-10-CM

## 2025-09-02 DIAGNOSIS — B37.9 YEAST INFECTION: ICD-10-CM

## 2025-09-02 PROCEDURE — 99214 OFFICE O/P EST MOD 30 MIN: CPT | Performed by: INTERNAL MEDICINE

## 2025-09-02 RX ORDER — CLOTRIMAZOLE AND BETAMETHASONE DIPROPIONATE 10; .64 MG/G; MG/G
CREAM TOPICAL
Qty: 45 G | Refills: 0 | Status: SHIPPED | OUTPATIENT
Start: 2025-09-02

## (undated) DEVICE — FORCEPS BX L240CM JAW DIA2.8MM L CAP W/ NDL MIC MESH TOOTH

## (undated) DEVICE — ENDOSCOPY KIT: Brand: MEDLINE INDUSTRIES, INC.